# Patient Record
Sex: FEMALE | Race: WHITE
[De-identification: names, ages, dates, MRNs, and addresses within clinical notes are randomized per-mention and may not be internally consistent; named-entity substitution may affect disease eponyms.]

---

## 2017-10-19 ENCOUNTER — HOSPITAL ENCOUNTER (OUTPATIENT)
Dept: HOSPITAL 25 - OREAST | Age: 62
Discharge: HOME | End: 2017-10-19
Attending: ORTHOPAEDIC SURGERY
Payer: COMMERCIAL

## 2017-10-19 VITALS — DIASTOLIC BLOOD PRESSURE: 82 MMHG | SYSTOLIC BLOOD PRESSURE: 122 MMHG

## 2017-10-19 DIAGNOSIS — I10: ICD-10-CM

## 2017-10-19 DIAGNOSIS — Z87.891: ICD-10-CM

## 2017-10-19 DIAGNOSIS — M24.842: ICD-10-CM

## 2017-10-19 DIAGNOSIS — E78.5: ICD-10-CM

## 2017-10-19 DIAGNOSIS — M18.0: Primary | ICD-10-CM

## 2017-10-19 DIAGNOSIS — Z85.828: ICD-10-CM

## 2017-10-19 PROCEDURE — 88311 DECALCIFY TISSUE: CPT

## 2017-10-19 PROCEDURE — 88304 TISSUE EXAM BY PATHOLOGIST: CPT

## 2017-10-19 PROCEDURE — C1713 ANCHOR/SCREW BN/BN,TIS/BN: HCPCS

## 2017-10-20 NOTE — OP
OPERATIVE REPORT:

 

DATE OF OPERATION:  10/19/17 - RACHELLE

 

DATE OF BIRTH:  [birth year]

 

SURGEON:  Mo Fountain MD.

 

ASSISTANT:  AMAURY Tom.

 

An assistant was needed for the entirety of the procedure to aid in positioning 
of the arm and retraction.



ANESTHESIOLOGIST:  Digna Valdes MD.



ANESTHESIA:  General.

 

PRE-OP DIAGNOSES:

1.  Left stage 3 basal joint arthritis.

2.  Significant hyperextension laxity of the left thumb metacarpophalangeal 
joint.

 

POST-OP DIAGNOSES:

1.  Left stage 4 basal joint arthritis.

2.  Significant hyperextension laxity of the left thumb metacarpophalangeal 
joint.

 

OPERATIVE PROCEDURE:

1.  Left thumb basal joint arthroplasty with trapeziectomy and distally based 
split flexor carpi radialis tendon transfer for thumb suspension and tendon 
interposition.

2.  Left partial trapeziodectomy.

3.  Left thumb metacarpophalangeal joint volar capsular imbrication.

 

INDICATIONS:  Terra has had progressive pain in the base of the thumb.  She 
is really significantly impacting her quality of life.  We talked about her 
treatment options and nonoperative treatment options and surgery and she had 
wanted to proceed.  We talked about the risks and benefits and expected 
postoperative course.

 

ESTIMATED BLOOD LOSS:  5 mL.

 

COMPLICATIONS:  None.

 

FINDINGS:  As expected with the exception of full thickness cartilage loss of 
the distal pole of the scaphoid in the scaphoid trapezoid joint and also off of 
the distal pole of the scaphoid and the scaphotrapezial joint.

 

DESCRIPTION OF PROCEDURE:  Terra was seen in the preoperative holding area.  
The correct side, site, and procedure were identified.  We came back to the 
operating room where the anesthesia was induced, the arm was prepped and draped 
in the usual fashion.  A time-out was performed.

 

We exsanguinated the arm with the Esmarch and the tourniquet was inflated to 
250 mmHg.  I then made a longitudinal incision from the dorsal base of the 
first metacarpal done towards the radial styloid of about 2 to 3 cm.  The 
dissection was carried down longitudinally to preserve the dorsal sensory 
radial nerves.  The radial artery was identified and mobilized.  I cauterized 
the venous plexus there and then made a longitudinal incision through the 
capsule and periosteum over the trapezium.  Full thickness subperiosteal and 
capsular flaps were raised.  The soft tissue around the trapezium was released 
with a [place] blade.  I used the rongeur to excise the trapezium in its 
entirety.  Once the entirety of the trapezium was excised and there were no 
bony fragments were made, I went ahead and pulled longitudinal traction on the 
second metacarpal and inspected the scaphotrapezoid joint.  The entirety of the 
distal pole of the scaphoid was to void of articular cartilage.  This involves 
both the scaphotrapezial and the scaphotrapezoid joints. I went ahead and took 
my osteotome and excised the proximal 2 to 3 mm of trapezoid. Once I had that 
excised, I irrigated out and got the joint nice and clean.  I then placed axial 
traction on the second metacarpal and took at the wrist through flexion and 
extension.  I could not get the base of the proximal aspect of the trapezoid to 
contact the distal pole of the scaphoid.  At this point, I went ahead and 
raised the subperiosteally the soft tissue off of the dorsal radial base of the 
first metacarpal.  I used sequentially larger drill bits to clear a bone tunnel 
from the dorsoradial thumb metacarpal base extending out the volar ulnar 
articular surface of the thumb metacarpal base.  Once I had a drill hole created
, we irrigated out the wound and turned our attention on the forearm.

 

I made a 1-cm transverse incision over the distal FCR tendon just proximal to 
the wrist flexion crease.  The tendon was created at the sheath and then 
brought up into the wound where it was split longitudinally and a 25-gauge wire 
was passed through the split.  I then made 2 separate transverse incisions each 
about 7 or 8 cm proximal to the left and the FCR tendon sheath was released 
along its entirety. I then used Alyssa clamp to retrieve the 25-gauge wire and 
pulled it sequentially into the 2 more proximal wounds releasing the half of 
the FCR tendon at the musculotendinous junction.  This was pulled into the 
distal wound and then brought up where the tendon split into the tendon, which 
created its muscular remnants and the edge of the tendon was sewed with 3-0 
Ethibond to prevent fraying and splitting during the transfer.  I then used two 
25-gauge wires to shuttle the tendon down into the thumb base wound through the 
remaining FCR sheath distally.  I then completed the split down to the base of 
the second metacarpal with the tenotomy scissors.  The wires were used to pass 
the tendon through the drill hole and the base of the thumb metacarpal and then 
back around the intact limb of the FCR. Appropriate tension was set and this 
tendon transfer was secured with three 3-0 Ethibond figure-of-eight sutures, 
the first grabbed in all 3 limbs of the transfer and then the second 2 sewing 
intact limb to intact limb.  The remainder of the tendon was sewn with 4-0 
Vicryl into a mat and this was placed as an interposition between the base of 
the trapezoid and the distal pole of the scaphoid.  The remainder of the tendon 
was placed between the distal pole of the scaphoid and the base of the thumb 
metacarpal.  The capsule was then closed with 3-0 Ethibond figure- of-eight 
sutures.  Once I had a watertight seal, I went ahead and turned our attention 
to the volar aspect of the thumb metacarpophalangeal joint.

 

I raised a Phyllis type V-shaped flap radially based over the volar aspect of 
the thumb metacarpophalangeal joint.  The A1 pulley was split.  The tendon was 
retracted.  I mobilized the volar plate by releasing it radially, proximally, 
and ulnarly.  I then placed a Mini Mitek suture anchor into the distal 
metacarpal.  The volar plate was advanced and sewn in place with the 2 limbs of 
the suture coming off of the suture anchor.  This was done with the thumb MCP 
joint and 30 degrees of flexion.  I took some 4-0 Vicryl suture and secured the 
remainder of the volar plate back into place.  When I tested the stability, I 
could get the thumb just to neutral extension, but not into hyperextension.  I 
was very pleased with this.  We therefore irrigated out the wounds.  All the 
wounds were then closed with 4-0 nylon suture.  The 0.25% Marcaine was 
infiltrated into all of the wounds.  The wounds were dressed with Xeroform, 4x4s
, Sterile Webril, and a thumb spica splint was placed, holding the MCP joint in 
30 degrees of flexion and the metacarpal in abduction.  Once the thumb spica 
splint was on, the tourniquet was deflated and the hand pinked up immediately.  
She was then woken up and taken to the recovery room in stable condition.

 

 [id number]/[facility] #: [id number]

SURAJ

## 2019-04-15 NOTE — HP
HISTORY AND PHYSICAL:

 

DATE OF ADMISSION/SURGERY:  04/25/19

 

DATE OF OFFICE VISIT:  04/15/19

 

SURGEON:  Ava Brody MD* (dictated by AMAURY Pablo).

 

PROCEDURE:  Right total knee arthroplasty.

 

CHIEF COMPLAINT:  Right knee pain.

 

HISTORY OF PRESENT ILLNESS:  Ms. Shepherd is a 63-year-old female with endstage 
osteoarthritis of the right knee.  She has failed conservative treatment and 
elected to proceed with the right total knee arthroplasty.

 

PAST MEDICAL HISTORY:

1.  Hypertension.

2.  Squamous cell carcinoma of the skin.

 

PAST SURGICAL HISTORY:

1.  Left foot surgery.

2.  Left hand surgery.

 

CURRENT MEDICATIONS:

1.  Escitalopram 10 mg a day.

2.  Atenolol 50 mg twice a day.

3.  Fenofibrate 54 mg a day.

4.  Simvastatin 10 mg q.h.s.

5.  Vitamin B12 monthly injection.

6.  Tylenol as needed.

 

ALLERGIES:  No known drug allergies.

 

FAMILY HISTORY:  Colon cancer and coronary artery disease.

 

SOCIAL HISTORY:  She is a 63-year-old female.  She lives with her .  She 
does not smoke or use drugs, uses occasional alcohol.

 

REVIEW OF SYSTEMS:  A complete 14-point review of systems reviewed with the 
patient.  It was all negative and noncontributory.  She denies a history of DVT
, PE, hepatitis, HIV, or anesthesia problems.

 

                               PHYSICAL EXAMINATION

 

GENERAL:  She is well developed, well nourished, in no acute distress.

 

VITAL SIGNS:  She stands 62 inches tall, weighs 147 pounds.  Her blood pressure 
is 122/64, heart rate 60.

 

HEENT:  Normocephalic, atraumatic.

 

NECK:  Supple, no palpable lymph nodes.

 

PULMONARY:  Lungs are clear to auscultation bilaterally.

 

CARDIAC:  Regular rate and rhythm.  Strong S1 and S2.

 

ABDOMEN:  Soft, nontender, nondistended.

 

NEUROLOGICAL:  She is alert and oriented x3.

 

MUSCULOSKELETAL:  Right lower extremity, the skin is intact.  There are no open 
wounds or abrasions.  There is a 12-degree valgus deformity of the right knee. 
There is some moderate effusion.  Range of motion is 5 to 125 degrees of 
flexion with significant patellofemoral crepitus.  Her calf is soft and 
nontender.  She is able to dorsiflex and plantarflex.  She has a 2+ dorsalis 
pedis pulse and intact sensation.

 

 ASSESSMENT AND PLAN:  Ms. Shepherd is a 63-year-old female with endstage 
osteoarthritis of the right knee.  She has failed conservative treatment and 
elected to proceed with a right total knee arthroplasty.  The surgery is 
scheduled for 04/25/19 with Dr. Brody.  Dr. Brody discussed the risks and 
benefits of the surgery at today's visit and all of her questions were 
answered.  She will follow up with Dr. Brody 2 weeks after the surgery.

 

 ____________________________________ 

AMAURY PABLO

 

[id number]/[facility] #: [id number]

SURAJ

## 2019-04-25 ENCOUNTER — HOSPITAL ENCOUNTER (INPATIENT)
Dept: HOSPITAL 25 - AA | Age: 64
LOS: 3 days | Discharge: HOME HEALTH | DRG: 302 | End: 2019-04-28
Attending: ORTHOPAEDIC SURGERY | Admitting: ORTHOPAEDIC SURGERY
Payer: COMMERCIAL

## 2019-04-25 DIAGNOSIS — Z82.0: ICD-10-CM

## 2019-04-25 DIAGNOSIS — R42: ICD-10-CM

## 2019-04-25 DIAGNOSIS — M21.061: ICD-10-CM

## 2019-04-25 DIAGNOSIS — Z72.89: ICD-10-CM

## 2019-04-25 DIAGNOSIS — E87.1: ICD-10-CM

## 2019-04-25 DIAGNOSIS — Z80.0: ICD-10-CM

## 2019-04-25 DIAGNOSIS — F32.9: ICD-10-CM

## 2019-04-25 DIAGNOSIS — M17.11: Primary | ICD-10-CM

## 2019-04-25 DIAGNOSIS — I45.6: ICD-10-CM

## 2019-04-25 DIAGNOSIS — D62: ICD-10-CM

## 2019-04-25 DIAGNOSIS — R11.0: ICD-10-CM

## 2019-04-25 DIAGNOSIS — Z82.49: ICD-10-CM

## 2019-04-25 DIAGNOSIS — G43.909: ICD-10-CM

## 2019-04-25 DIAGNOSIS — R09.02: ICD-10-CM

## 2019-04-25 DIAGNOSIS — M25.461: ICD-10-CM

## 2019-04-25 DIAGNOSIS — I10: ICD-10-CM

## 2019-04-25 DIAGNOSIS — J90: ICD-10-CM

## 2019-04-25 DIAGNOSIS — J81.1: ICD-10-CM

## 2019-04-25 DIAGNOSIS — Z79.01: ICD-10-CM

## 2019-04-25 DIAGNOSIS — K21.9: ICD-10-CM

## 2019-04-25 DIAGNOSIS — E78.00: ICD-10-CM

## 2019-04-25 DIAGNOSIS — E53.8: ICD-10-CM

## 2019-04-25 DIAGNOSIS — M25.761: ICD-10-CM

## 2019-04-25 DIAGNOSIS — Z85.828: ICD-10-CM

## 2019-04-25 DIAGNOSIS — F41.9: ICD-10-CM

## 2019-04-25 DIAGNOSIS — E78.5: ICD-10-CM

## 2019-04-25 DIAGNOSIS — Z87.891: ICD-10-CM

## 2019-04-25 PROCEDURE — 80048 BASIC METABOLIC PNL TOTAL CA: CPT

## 2019-04-25 PROCEDURE — 83880 ASSAY OF NATRIURETIC PEPTIDE: CPT

## 2019-04-25 PROCEDURE — 36415 COLL VENOUS BLD VENIPUNCTURE: CPT

## 2019-04-25 PROCEDURE — 0SRC069 REPLACEMENT OF RIGHT KNEE JOINT WITH OXIDIZED ZIRCONIUM ON POLYETHYLENE SYNTHETIC SUBSTITUTE, CEMENTED, OPEN APPROACH: ICD-10-PCS | Performed by: ORTHOPAEDIC SURGERY

## 2019-04-25 PROCEDURE — 88311 DECALCIFY TISSUE: CPT

## 2019-04-25 PROCEDURE — 71045 X-RAY EXAM CHEST 1 VIEW: CPT

## 2019-04-25 PROCEDURE — 85018 HEMOGLOBIN: CPT

## 2019-04-25 PROCEDURE — 83735 ASSAY OF MAGNESIUM: CPT

## 2019-04-25 PROCEDURE — 85049 AUTOMATED PLATELET COUNT: CPT

## 2019-04-25 PROCEDURE — C1776 JOINT DEVICE (IMPLANTABLE): HCPCS

## 2019-04-25 PROCEDURE — 85014 HEMATOCRIT: CPT

## 2019-04-25 PROCEDURE — 88305 TISSUE EXAM BY PATHOLOGIST: CPT

## 2019-04-25 RX ADMIN — ACETAMINOPHEN SCH MG: 325 TABLET ORAL at 16:04

## 2019-04-25 RX ADMIN — ONDANSETRON PRN MG: 2 INJECTION INTRAMUSCULAR; INTRAVENOUS at 23:08

## 2019-04-25 RX ADMIN — CEFAZOLIN SCH MLS/HR: 1 INJECTION, POWDER, FOR SOLUTION INTRAVENOUS at 19:39

## 2019-04-25 RX ADMIN — SODIUM CHLORIDE, SODIUM LACTATE, POTASSIUM CHLORIDE, AND CALCIUM CHLORIDE SCH MLS/HR: 600; 310; 30; 20 INJECTION, SOLUTION INTRAVENOUS at 15:16

## 2019-04-25 RX ADMIN — OXYCODONE HYDROCHLORIDE AND ACETAMINOPHEN PRN TAB: 5; 325 TABLET ORAL at 23:08

## 2019-04-25 RX ADMIN — MAGNESIUM HYDROXIDE SCH ML: 400 SUSPENSION ORAL at 21:07

## 2019-04-25 RX ADMIN — TRAMADOL HYDROCHLORIDE PRN MG: 50 TABLET, FILM COATED ORAL at 18:07

## 2019-04-25 RX ADMIN — DOCUSATE SODIUM SCH MG: 100 CAPSULE, LIQUID FILLED ORAL at 21:07

## 2019-04-25 NOTE — PN
Progress Note





- Progress Note


Date of Service: 04/25/19


Note: 





resting comfortably in recovery; able to dorsi flex/plantar flex, 2+ DP pulse 

and intact sensation, dressing c/d/i

## 2019-04-25 NOTE — XMS REPORT
Continuity of Care Document (CCD)

 Created on:April 15, 2019



Patient:Presley Castanon

Sex:Female

:1955

External Reference #:[id number]





Demographics







 Address  [address]



   [place] [id number]

 

 Home Phone  [phone]

 

 Mobile Phone  [phone]

 

 Work Phone  [phone]

 

 Email Address  [email or web address]

 

 Preferred Language  en

 

 Marital Status  Not  or 

 

 Zoroastrianism Affiliation  Unknown

 

 Race  White

 

 Ethnic Group  Not  or 









Author







 Name  ZaraAlexus thacker









Support







 Name  Relationship  Address  Phone

 

 Gaurav Castanon  Unavailable  [address] [place] [address] RD  [phone]



     [place] [address]  









Care Team Providers







 Name  Role  Phone

 

 Wendy Parekh MD  Primary Care Physician  Unavailable









Payers







 Date  Identification Numbers  Payment Provider  Subscriber

 

 Effective: 2017  Policy Number: [id number]  [employer]  Presley Castanon









 PayID: [id number]  [address] [address]









 [place] [id number]









 Effective: 2011  Policy Number: [id number]  [place]  Presley Castanon









 Expires: 2015  PayID: [id number]  [address] [address]









 [place] [id number]







Advance Directives







 Description

 

 No Information Available







Problems







 Date  Description  Provider  Status

 

 Onset: 2011  Mixed hyperlipidemia  Lorrie Rodríguez M.D., FACP  Active

 

 Onset: 2011  Benign essential hypertension  Laith Hart M.D.  
Active

 

 Onset: 2017  Localized, primary osteoarthritis  Mo Fountain MD  
Active



   of the hand    

 

 Onset: 10/26/2018  Localized, primary osteoarthritis  Ava Brody M.D.  
Active







Family History







 Date  Family Member(s)  Observation  Comments

 

   General  Heart Disease  

 

   General  Cancer  

 

 :  (age 58  Father   due to Cancer,  



 Years)    Colon  

 

   Mother  CABG  HTN, Dementia - Age 85



        

 

   First Son  Alive And Well  

 

   First Brother  Alive And Well  

 

   First Sister  Alive And Well  







Social History







 Type  Date  Description  Comments

 

 Birth Sex    Unknown  

 

 Marital Status      

 

 Lives With      

 

 Occupation    Banking  

 

 Tobacco Use  Start: Unknown End:  Former Cigarette Smoker  Quit ~ 



   Unknown  1 Pack Daily  

 

 Smoking Status  Reviewed: 04/15/19  Former Cigarette Smoker  Quit ~ 



     1 Pack Daily  

 

 ETOH Use    Currently consumes  2 glasses of wine



     alcohol  daily

 

 Tobacco Use  Start: Unknown End:  Patient is a former  quit at age 29



   Unknown  smoker  

 

 Exercise Type/Frequency    Exercises regularly  







Allergies, Adverse Reactions, Alerts







 Description

 

 No Known Drug Allergies







Medications







 Medication  Date  Status  Form  Strength  Qnty  SIG  Indications  Ordering



                 Provider

 

 Voltaren  03/15  Active  Gel  1%  100un  apply 4 Gm  M25.562          its  to affected    Varn,



             area four    N.P.



             times daily    

 

 BD    Active    3ml  6unit  use as            s  directed for    Varn,



             vitamin b 12    N.P.



             injection    



             once a month    

 

 Fenofibrate    Active  Tablets  54mg  90tab  take 1            s  tablet by    Varn,



             mouth daily    N.P.

 

 Omeprazole    Active  Capsules DR  20mg  90cap  take 1            s  capsule once    Varn,



             daily if    N.P.



             needed    

 

 Syringes    Active    25Gauge 1  30uni  use as          Inch  ts  directed for    Varn,



             vitamin b12    N.P.



             injections    



             once monthly    

 

 Simvastatin    Active  Tablets  10mg  90tab  take 1            s  tablet at    Varn,



             bedtime    N.P.

 

 Atenolol    Active  Tablets  50mg  90tab  take 1    Archana        s  tablet twice    Varn,



             a day    N.P.

 

 Cyanocobalamin    Active  Solution  1000mcg/M  1unit  inject 1          L  s  milliliters    Varn,



             intramuscula    N.P.



             r once a    



             month    

 

 Tylenol    Active            Unknown



   /0000              

 

                 

 

 Escitalopram  03/15  Hx  Tablets  10mg  30tab  1 by mouth  F32.9  Archana



 Oxalate  /2019        s  every day    Varn,



   -              N.P.



                 

 

 Meloxicam  10/26  Hx  Tablets  15mg  30tab  1 by mouth  M25.461  Ava        s  every day    Ronn



   -          (not taking)    M.D.



   03/15              



   /2019              

 

 Tramadol HCL  10/19  Hx  Tablets  50mg  30tab  1-2 tablets            s  every 6    Fountain,



   -          hours as    MD



                 

 

 Ibuprofen  10/19  Hx  Tablets  600mg  60tab  1 by mouth            s  three times    Fountain,



   -          a day as    MD



             needed    



                 

 

 Hydrocodone-Ace  10/16  Hx  Tablets  5-325mg  30tab  1 or 2 tabs    Mo



 taminophen          s  by mouth    Fountain,



   -          every 6-8    MD



   10/19          hours as    



   /2017          needed for    



             postop pain    

 

 Syringe 2-3 ML    Hx  Misc  3ml  30uni  to use for            ts  b12    Varn,



   -          injections    N.P.



                 

 

 Escitalopram    Hx  Tablets  10mg  30tab  1 by mouth  311  Archana



 Oxalate  /2015        s  every day    Varn,



   -              N.P.



                 

 

 Ciclopirox    Hx  Solution  8%  6.600  apply to  110.1          ml  affected    Varn,



   -          toenails and    N.P.



             skin at    



             bedtime    

 

 Lac-Hydrin    Hx  Cream  12%  385gm  apply once  782.1            daily as    Varn,



   -          needed    N.P.



                 

 

 Fluticasone    Hx  Suspension  50mcg/Act  1bott  1 spray each    Archana



 Propionate  /2013        le  nostril in    Varn,



   -          in the    N.P.



   04/14          morning    



   /2019              

 

 Physical    Hx        left ankle  719.47  Lorrie



 Therapy  /2011          pain    Molina Rodríguez M.D.,



                 FACP



                 

 

 Omeprazole    Hx  Capsules DR  20mg  90cap  1 tablet    Lorrie        s  daily as    Anne,



   -          needed for    M.D.,



             stomach    FACP



             discomfort    

 

 Syringes    Hx            Lorrie



   /              Molina Rodríguez M.D.,



                 FACP



                 

 

 Minocycline HCL    Hx    50mg        Jayla,



   /0000              Lady,



   -              MD



                 

 

 Minocycline HCL    Hx    50mg        Jayla,



   /0000              Lady



   -              MD



                 

 

 Prempro    Hx    0.625-2.5    1 tablet    Anne,



   /0000      mg    daily    Lorrie,



   -              MD



   10/21              



   /2010              

 

 Sulfacetamide    Hx    10%    apply daily    Jayla,



 Sodium  /0000              Lady,



   -              MD



                 

 

 Diazepam    Hx    5mg        Montanye,



   /0000              Bladimir



   -              MD



                 

 

 Valacyclovir    Hx    500mg        Anne,



 HCL  /0000              Molina Andrew MD



                 

 

 Oracea    Hx    40mg        Jayla,



   /0000              Molina Narayanan MD



                 

 

 Finacea    Hx    15%        Jayla,



   /0000              Molina Narayanan MD



                 

 

 Rhinocort Aqua    Hx    32mcg/Act  3unit  1 spray each    Lorrie



   /0000        s  nostril    Anne,



   -          daily    M.DUlices,



                 







Medications Administered in Office







 Medication  Date  Status  Form  Strength  Qnty  SIG  Indications  Ordering



                 Provider

 

 Synvisc Or    Administered  Injection          Ava



 Synvisc-One  Sloan Brody M.D.



 Injection 1 MG                

 

 Synvisc Or    Administered  Injection          Ava



 Synvisc-One  Hernan Brody M.D.



 Injection 1 MG                

 

 Synvisc Or    Administered  Injection          Ava



 Synvisc-One  Hernan Brody M.D.



 Injection 1 MG                

 

 Synvisc Or  12/10/2  Administered  Injection          Ava



 Synvisc-One  Hernan Brody M.D.



 Injection 1 MG                

 

 Synvisc Or  12/10/2  Administered  Injection          Ava



 Synvisc-One  Hernan Brody M.D.



 Injection 1 MG                

 

 Synvisc Or    Administered  Injection          Ava



 Synvisc-One  Hernan Brody M.D.



 Injection 1 MG                

 

 Depomedrol    Administered  Injection          Ava



 40MG  Hernan Brody M.D.

 

 Depomedrol  10/26/2  Administered  Injection          Ava



 40MG  Hernan Brody M.D.

 

 B-12 Injection    Administered  Injection          Nurse Visit



   014              A







Immunizations







 CPT Code  Status  Date  Vaccine  Lot #

 

 [id number]  Given  2018  Influenza Virus Vaccine, Quadrivalent, Split,  



       Preservative Free  

 

   Given  2016  Flu Vaccine NOS  

 

 86564  Given  10/10/2015  Influenza Virus Vaccine, Quadrivalent, Split,  



       Preservative Free  

 

   Given  2014  Fluvirin Im 3Yrs And Older  

 

   Given  2013  Fluvirin Im 3Yrs And Older  

 

   Given  2012  Afluria Vaccine  

 

 [id number]  Given  2011  Influenza Virus 3Yrs & Over  

 

 54449  Given  2011  Influenza Virus 3Yrs & Over  

 

 25389  Given  2011  Influenza Virus 3Yrs & Over  [id number]

 

 [id number]  Given  10/21/2010  Influenza Virus 3Yrs & Over  

 

 [id number]  Given  10/28/2009  Influenza Virus 3Yrs & Over  

 

 96123  Given  2008  Tdap - Tetanus/Diptheria/Acellular Pertussis  







Vital Signs







 Date  Vital  Result  Comment

 

 04/15/2019 12:57pm  Height  62 inches  5'2"









 Weight  147.00 lb  

 

 Heart Rate  60 /min  

 

 BP Systolic  122 mmHg  

 

 BP Diastolic  62 mmHg  

 

 Respiratory Rate  18 /min  

 

 Body Temperature  98.9 F  

 

 Pain Level  0  

 

 BMI (Body Mass Index)  26.9 kg/m2  









 04/10/2019  3:37pm  Height  62 inches  5'2"









 Weight  150.00 lb  

 

 Heart Rate  60 /min  

 

 BP Systolic Sitting  137 mmHg  

 

 BP Diastolic Sitting  81 mmHg  

 

 O2 % BldC Oximetry  98 %  

 

 BMI (Body Mass Index)  27.4 kg/m2  









 2019  9:10am  Height  62 inches  5'2"









 Weight  145.00 lb  

 

 Heart Rate  60 /min  

 

 BP Systolic Sitting  110 mmHg  

 

 BP Diastolic Sitting  70 mmHg  

 

 Respiratory Rate  18 /min  

 

 Pain Level  3  

 

 BMI (Body Mass Index)  26.5 kg/m2  









 03/15/2019  3:34pm  Height  62 inches  5'2"









 Weight  152.00 lb  

 

 Heart Rate  56 /min  

 

 BP Systolic  132 mmHg  

 

 BP Diastolic  80 mmHg  

 

 Body Temperature  97.7 F  

 

 O2 % BldC Oximetry  98 %  

 

 BMI (Body Mass Index)  27.8 kg/m2  









 2019  4:05pm  Height  62 inches  5'2"









 Weight  151.00 lb  

 

 Heart Rate  69 /min  

 

 BP Systolic  138 mmHg  

 

 BP Diastolic  82 mmHg  

 

 Body Temperature  97.7 F  

 

 O2 % BldC Oximetry  97 %  

 

 BMI (Body Mass Index)  27.6 kg/m2  









 2019  9:14am  Height  62 inches  5'2"









 Weight  145.00 lb  

 

 BP Systolic  124 mmHg  

 

 BP Diastolic  84 mmHg  

 

 Body Temperature  98.2 F  

 

 BMI (Body Mass Index)  26.5 kg/m2  









 2018  8:52am  Height  62.5 inches  5'2.50"









 Weight  145.00 lb  

 

 BP Systolic  120 mmHg  

 

 BP Diastolic  80 mmHg  

 

 Body Temperature  97.6 F  

 

 BMI (Body Mass Index)  26.1 kg/m2  









 12/10/2018  8:34am  Height  62 inches  5'2"









 Weight  145.00 lb  

 

 BP Systolic  108 mmHg  

 

 BP Diastolic  64 mmHg  

 

 Body Temperature  98.2 F  

 

 BMI (Body Mass Index)  26.5 kg/m2  









 2018  8:23am  Height  62 inches  5'2"









 Weight  144.00 lb  

 

 BP Systolic  118 mmHg  

 

 BP Diastolic  72 mmHg  

 

 Body Temperature  98.4 F  

 

 BMI (Body Mass Index)  26.3 kg/m2  









 2018  3:43pm  Height  62 inches  5'2"









 Heart Rate  63 /min  

 

 BP Systolic  108 mmHg  

 

 BP Diastolic  60 mmHg  

 

 Respiratory Rate  17 /min  

 

 Pain Level  2  









 10/26/2018 11:24am  Height  62 inches  5'2"









 Weight  150.00 lb  

 

 BP Systolic  131 mmHg  

 

 BP Diastolic  82 mmHg  

 

 Respiratory Rate  16 /min  

 

 Pain Level  5  

 

 BMI (Body Mass Index)  27.4 kg/m2  









 2018  3:00pm  Height  62 inches  5'2"









 Weight  147.00 lb  

 

 Heart Rate  55 /min  

 

 BP Systolic  113 mmHg  

 

 BP Diastolic  71 mmHg  

 

 O2 % BldC Oximetry  100 %  

 

 BMI (Body Mass Index)  26.9 kg/m2  









 2018  3:48pm  Heart Rate  62 /min  









 BP Systolic  130 mmHg  

 

 BP Diastolic  78 mmHg  

 

 Respiratory Rate  16 /min  

 

 Body Temperature  97.0 F  

 

 Pain Level  0  









 2017  3:00pm  Heart Rate  64 /min  









 BP Systolic Sitting  128 mmHg  

 

 BP Diastolic Sitting  78 mmHg  

 

 Body Temperature  97.8 F  









 2017 12:57pm  Height  62.5 inches  5'2.50"









 Weight  157.00 lb  

 

 Heart Rate  64 /min  

 

 BP Systolic  105 mmHg  

 

 BP Diastolic  71 mmHg  

 

 Body Temperature  98.3 F  

 

 Pain Level  7  

 

 BMI (Body Mass Index)  28.3 kg/m2  









 2017  9:51am  Height  62.5 inches  5'2.50"









 Weight  157.25 lb  

 

 Heart Rate  74 /min  

 

 BP Systolic  122 mmHg  

 

 BP Diastolic  70 mmHg  

 

 Body Temperature  97.6 F  

 

 O2 % BldC Oximetry  99 %  

 

 BMI (Body Mass Index)  28.3 kg/m2  









 2017  8:32am  Height  62.25 inches  5'2.25"









 Weight  154.25 lb  

 

 Heart Rate  61 /min  

 

 BP Systolic  120 mmHg  

 

 BP Diastolic  80 mmHg  

 

 Body Temperature  98.6 F  

 

 O2 % BldC Oximetry  94 %  

 

 BMI (Body Mass Index)  28.0 kg/m2  









 2016 11:03am  Height  62.25 inches  5'2.25"









 Weight  154.50 lb  

 

 Heart Rate  60 /min  

 

 BP Systolic Sitting  136 mmHg  

 

 BP Diastolic Sitting  82 mmHg  

 

 Body Temperature  98.3 F  

 

 O2 % BldC Oximetry  100 %  

 

 BMI (Body Mass Index)  28.0 kg/m2  









 2016  9:35am  Height  62.5 inches  5'2.50"









 Weight  154.00 lb  

 

 Heart Rate  60 /min  

 

 BP Systolic Sitting  136 mmHg  

 

 BP Diastolic Sitting  88 mmHg  

 

 Body Temperature  98.1 F  

 

 O2 % BldC Oximetry  99 %  

 

 BMI (Body Mass Index)  27.7 kg/m2  









 2015  8:37am  Height  62.5 inches  5'2.50"









 Weight  149.75 lb  

 

 Heart Rate  63 /min  

 

 BP Systolic  159 mmHg  

 

 BP Diastolic  96 mmHg  

 

 BP Systolic Recheck  138 mmHg  

 

 BP Diastolic Recheck  88 mmHg  

 

 Body Temperature  98.6 F  

 

 BMI (Body Mass Index)  27.0 kg/m2  









 2015  8:54am  Height  62.5 inches  5'2.50"









 Weight  145.00 lb  

 

 Heart Rate  60 /min  

 

 BP Systolic Sitting  128 mmHg  

 

 BP Diastolic Sitting  78 mmHg  

 

 Body Temperature  99.2 F  

 

 BMI (Body Mass Index)  26.1 kg/m2  









 2013  1:57pm  Height  62.5 inches  5'2.50"









 Weight  153.25 lb  

 

 Heart Rate  68 /min  

 

 BP Systolic Sitting  142 mmHg  

 

 BP Diastolic Sitting  80 mmHg  

 

 BMI (Body Mass Index)  27.6 kg/m2  









 2012  3:44pm  Height  62.5 inches  5'2.50"









 Weight  143.00 lb  

 

 Heart Rate  68 /min  

 

 BP Systolic Sitting  124 mmHg  

 

 BP Diastolic Sitting  80 mmHg  

 

 BMI (Body Mass Index)  25.7 kg/m2  









 2012 10:32am  Height  62.5 inches  5'2.50"









 Weight  144.00 lb  

 

 Heart Rate  60 /min  

 

 BP Systolic Sitting  136 mmHg  

 

 BP Diastolic Sitting  80 mmHg  

 

 BMI (Body Mass Index)  25.9 kg/m2  









 10/20/2011  3:45pm  Height  62 inches  5'2"









 Weight  147.00 lb  

 

 Heart Rate  76 /min  

 

 BP Systolic Sitting  148 mmHg  

 

 BP Diastolic Sitting  80 mmHg  

 

 BMI (Body Mass Index)  26.9 kg/m2  









 2011 12:50pm  Height  62 inches  5'2"









 Weight  144.00 lb  

 

 Heart Rate  68 /min  

 

 BP Systolic Sitting  126 mmHg  

 

 BP Diastolic Sitting  80 mmHg  

 

 BMI (Body Mass Index)  26.3 kg/m2  









 2011  3:41pm  Height  62 inches  5'2"









 Weight  145.00 lb  

 

 BMI (Body Mass Index)  26.5 kg/m2  









 2011  3:50pm  Weight  146.00 lb  









 Heart Rate  66 /min  

 

 BP Systolic Sitting  124 mmHg  

 

 BP Diastolic Sitting  82 mmHg  









 2011  3:47pm  Weight  147.00 lb  









 Heart Rate  62 /min  

 

 BP Systolic  130 mmHg  

 

 BP Diastolic  80 mmHg  









 2011  2:23pm  Weight  146.75 lb  









 Heart Rate  78 /min  

 

 BP Systolic  118 mmHg  

 

 BP Diastolic  82 mmHg  









 10/21/2010  9:09am  Weight  147.75 lb  









 Heart Rate  72 /min  

 

 BP Systolic  120 mmHg  

 

 BP Diastolic  78 mmHg  







Results







 Test  Date  Facility  Test  Result  H/L  Range  Note

 

 CBC Auto Diff  04/10/2019  [facility]  White Blood  7.3 10^3/uL  N  
3.5-10.8  



     [address]  Count        



     [place] [address] [phone]          









 Red Blood Count  3.63 10^6/uL  Low  3.70-4.87  

 

 Hemoglobin  12.4 g/dL  N  12.0-16.0  

 

 Hematocrit  36 %  N  33-41  

 

 Mean Corpuscular Volume  99 fL  High  80-97  

 

 Mean Corpuscular Hemoglobin  34 pg  High  27-31  

 

 Mean Corpuscular HGB Conc  35 g/dL  N  31-36  

 

 Red Cell Distribution Width  13 %  N  10.5-15  

 

 Platelet Count  214 10^3/uL  N  150-450  

 

 Mean Platelet Volume  9.1 fL  N  7.4-10.4  

 

 Abs Neutrophils  4.5 10^3/uL  N  1.5-7.7  

 

 Abs Lymphocytes  2.0 10^3/uL  N  1.0-4.8  

 

 Abs Monocytes  0.6 10^3/uL  N  0-0.8  

 

 Abs Eosinophils  0.2 10^3/uL  N  0-0.6  

 

 Abs Basophils  0 10^3/uL  N  0-0.2  

 

 Abs Nucleated RBC  0 10^3/uL      

 

 Granulocyte %  61.8 %      

 

 Lymphocyte %  27.0 %      

 

 Monocyte %  8.5 %      

 

 Eosinophil %  2.1 %      

 

 Basophil %  0.6 %      

 

 Nucleated Red Blood Cells %  0      









 Comp Metabolic Panel  04/10/2019  [facility]  Sodium  138 mmol/L  N
  135-145  



     [address]          



     [place] [address] [phone]          









 Potassium  4.3 mmol/L  N  3.5-5.0  

 

 Chloride  105 mmol/L  N  101-111  

 

 Co2 Carbon Dioxide  26 mmol/L  N  22-32  

 

 Anion Gap  7 mmol/L  N  2-11  

 

 Glucose  92 mg/dL  N    

 

 Blood Urea Nitrogen  19 mg/dL  N  6-24  

 

 Creatinine  0.79 mg/dL  N  0.51-0.95  

 

 BUN/Creatinine Ratio  24.1  High  8-20  

 

 Calcium  9.2 mg/dL  N  8.6-10.3  

 

 Total Protein  7.0 g/dL  N  6.4-8.9  

 

 Albumin  4.3 g/dL  N  3.2-5.2  

 

 Globulin  2.7 g/dL  N  2-4  

 

 Albumin/Globulin Ratio  1.6  N  1-3  

 

 Total Bilirubin  0.40 mg/dL  N  0.2-1.0  

 

 Alkaline Phosphatase  52 U/L  N    

 

 Alt  15 U/L  N  7-52  

 

 Ast  17 U/L  N  13-39  

 

 Egfr Non-  73.3    >60  

 

 Egfr   88.7    >60  1









 Urinalysis Profile  04/10/2019  [facility]  Urine Color  Straw      



     [address]  [place] [address] [phone]          









 Urine Appearance  Clear      

 

 Urine Specific Gravity  1.005  Low  1.010-1.030  

 

 Urine pH  5.0  N  5-9  

 

 Urine Urobilinogen  Negative    Negative  

 

 Urine Ketones  Negative    Negative  

 

 Urine Protein  Negative    Negative  

 

 Urine Leukocytes  Negative    Negative  

 

 Urine Blood  1+  Abnormal  Negative  

 

 Urine Nitrite  Negative    Negative  

 

 Urine Bilirubin  Negative    Negative  

 

 Urine Glucose  Negative    Negative  

 

 Urine White Blood Cell  Trace(0-5/hpf)    Absent  

 

 Urine Red Blood Cell  Trace(0-2/hpf)    Absent  

 

 Urine Bacteria  Absent    Absent  









 Inr/Protime  04/10/2019  [facility]  Inr  0.94  N  0.77-1.02  



     [address]          



     [place], [place] [address] [phone]          

 

 Hepatitis Acute  04/10/2019  [facility]  Hepatitis B  Nonreactive  
  Nonreactive  



 Panel    [address]        



     [place] [address]  Antigen        



     [phone]          









 Hepatitis B Core IgM  Nonreactive    Nonreactive  

 

 Hepatitis A AB IgM  Nonreactive    Nonreactive  

 

 HCV Index  < 0.0 Index      

 

 Hepatitis C Antibody  Nonreactive    Nonreactive  









 Urine Culture And  04/10/2019  [facility]  Urine Culture  SEE 
RESULT      2



 Sensitivities    [address]      



     [place] [address] [phone]          

 

 Laboratory test  10/04/2018  [facility]  Vitamin B12  417 pg/mL  N  
180-9  3



 finding    [address]        [address]  



     [place] [address] [phone]          

 

 Lipid Profile  2018  [facility]  Triglycerides  164 mg/dL    
  4



 (Trig/Chol/HDL)    [address]          



     [place], [place] [address] [phone]          









 Cholesterol  211 mg/dL      5

 

 HDL Cholesterol  71.1 mg/dL      6

 

 LDL Cholesterol  107 mg/dL      7









 Comp Metabolic Panel  2018  [facility]  Sodium  140 mmol/L  N
  135-145  



     [address]          



     [place] [address] [phone]          









 Potassium  4.9 mmol/L  N  3.5-5.0  

 

 Chloride  108 mmol/L  N  101-111  

 

 Co2 Carbon Dioxide  28 mmol/L  N  22-32  

 

 Anion Gap  4 mmol/L  N  2-11  

 

 Glucose  102 mg/dL  High    

 

 Blood Urea Nitrogen  14 mg/dL  N  6-24  

 

 Creatinine  0.79 mg/dL  N  0.51-0.95  

 

 BUN/Creatinine Ratio  17.7  N  8-20  

 

 Calcium  9.3 mg/dL  N  8.6-10.3  

 

 Total Protein  6.4 g/dL  N  6.4-8.9  

 

 Albumin  4.3 g/dL  N  3.2-5.2  

 

 Globulin  2.1 g/dL  N  2-4  

 

 Albumin/Globulin Ratio  2.0  N  1-3  

 

 Total Bilirubin  0.70 mg/dL  N  0.2-1.0  

 

 Alkaline Phosphatase  62 U/L  N    

 

 Alt  24 U/L  N  7-52  

 

 Ast  19 U/L  N  13-39  

 

 Egfr Non-  73.3    >60  

 

 Egfr   88.7    >60  8









 Laboratory test  10/19/2017  [facility]  Surgical Pathology  SEE 
RESULT      9, 10



 finding    [address]    [place]      



     [place] [address] [phone]          

 

 Lipid Profile  2017  [facility]  Triglycerides  190 mg/dL  N 
   11, 12



 (Trig/Chol/HDL)    [address]          



     [place] [address] [phone]          









 Cholesterol  185 mg/dL  N    13

 

 HDL Cholesterol  70.9 mg/dL  N    14

 

 LDL Cholesterol  76 mg/dL  N    15









 Comp Metabolic Panel  2017  [facility]  Sodium  140 mmol/L  N
  133-145  



     [address]          



     [place] [address] [phone]          









 Potassium  4.5 mmol/L  N  3.5-5.0  

 

 Chloride  104 mmol/L  N  101-111  

 

 Co2 Carbon Dioxide  28 mmol/L  N  22-32  

 

 Anion Gap  8 mmol/L  N  2-11  

 

 Glucose  87 mg/dL  N    

 

 Blood Urea Nitrogen  16 mg/dL  N  6-24  

 

 Creatinine  0.75 mg/dL  N  0.51-0.95  

 

 BUN/Creatinine Ratio  21.3  High  8-20  

 

 Calcium  9.5 mg/dL  N  8.6-10.3  

 

 Total Protein  6.7 g/dL  N  6.4-8.9  

 

 Albumin  4.3 g/dL  N  3.2-5.2  

 

 Globulin  2.4 g/dL  N  2-4  

 

 Albumin/Globulin Ratio  1.8  N  1-3  

 

 Total Bilirubin  0.50 mg/dL  N  0.2-1.0  

 

 Alkaline Phosphatase  69 U/L  N    

 

 Alt  21 U/L  N  7-52  

 

 Ast  16 U/L  N  13-39  

 

 Egfr Non-  78.3  N  >60  

 

 Egfr   100.7  N  >60  16









 Laboratory test  2017  [facility]  Vitamin B12  574 pg/mL  N  
180-914  17



 finding    [address] [place] [address] [phone]          

 

 CBC Auto Diff  2016  [facility]  White Blood  6.1 10^3/uL  N  
3.5-10.8  



     [address] [facility]  Count        



     [place] [address] [phone]          









 Red Blood Count  3.75 10^6/uL  Low  4.0-5.4  

 

 Hemoglobin  12.7 g/dL  N  12.0-16.0  

 

 Hematocrit  38 %  N  35-47  

 

 Mean Corpuscular Volume  100 fL  High  80-97  

 

 Mean Corpuscular Hemoglobin  34 pg  High  27-31  

 

 Mean Corpuscular HGB Conc  34 g/dL  N  31-36  

 

 Red Cell Distribution Width  13 %  N  10.5-15  

 

 Platelet Count  208 10^3/uL  N  150-450  

 

 Mean Platelet Volume  10 um3  N  7.4-10.4  

 

 Abs Neutrophils  3.7 10^3/uL  N  1.5-7.7  

 

 Abs Lymphocytes  1.5 10^3/uL  N  1.0-4.8  

 

 Abs Monocytes  0.6 10^3/uL  N  0-0.8  

 

 Abs Eosinophils  0.1 10^3/uL  N  0-0.6  

 

 Abs Basophils  0.1 10^3/uL  N  0-0.2  

 

 Abs Nucleated RBC  0.01 10^3/uL  N    

 

 Granulocyte %  61.5 %  N  38-83  

 

 Lymphocyte %  24.2 %  Low  25-47  

 

 Monocyte %  10.3 %  High  1-9  

 

 Eosinophil %  1.6 %  N  0-6  

 

 Basophil %  2.4 %  High  0-2  

 

 Nucleated Red Blood Cells %  0.1  N    









 Comp Metabolic Panel  2016  [facility]  Sodium  137 mmol/L  N
  133-145  



     [address] [place] [address] [phone]          









 Potassium  4.6 mmol/L  N  3.5-5.0  

 

 Chloride  104 mmol/L  N  101-111  

 

 Co2 Carbon Dioxide  25 mmol/L  N  22-32  

 

 Anion Gap  8 mmol/L  N  2-11  

 

 Glucose  94 mg/dL  N    

 

 Blood Urea Nitrogen  14 mg/dL  N  6-24  

 

 Creatinine  0.85 mg/dL  N  0.51-0.95  

 

 BUN/Creatinine Ratio  16.5  N  8-20  

 

 Calcium  9.6 mg/dL  N  8.6-10.3  

 

 Total Protein  6.9 g/dL  N  6.4-8.9  

 

 Albumin  4.3 g/dL  N  3.2-5.2  

 

 Globulin  2.6 g/dL  N  2-4  

 

 Albumin/Globulin Ratio  1.7  N  1-3  

 

 Total Bilirubin  0.50 mg/dL  N  0.2-1.0  

 

 Alkaline Phosphatase  45 U/L  N    

 

 Alt  16 U/L  N  7-52  

 

 Ast  16 U/L  N  13-39  

 

 Egfr Non-  68.0  N  >60  

 

 Egfr   87.4  N  >60  18









 Inr/Protime  2016  [facility]  Inr  0.96  N  0.89-1.11  



     [address]  [place] [address] [phone]          

 

 Basic Metabolic  2016  [facility]  Sodium  138 mmol/L  N  133-
145  



 Panel    [address]  [place] [address] [phone]          









 Potassium  4.2 mmol/L  N  3.5-5.0  

 

 Chloride  105 mmol/L  N  101-111  

 

 Co2 Carbon Dioxide  25 mmol/L  N  22-32  

 

 Anion Gap  8 mmol/L  N  2-11  

 

 Glucose  94 mg/dL  N    

 

 Blood Urea Nitrogen  12 mg/dL  N  6-24  

 

 Creatinine  0.83 mg/dL  N  0.51-0.95  

 

 BUN/Creatinine Ratio  14.5  N  8-20  

 

 Calcium  9.6 mg/dL  N  8.6-10.3  

 

 Egfr Non-  69.9  N  >60  

 

 Egfr   89.9  N  >60  19









 Laboratory test finding  2016  [facility]  Alt (SGPT)  30 U/L
  N  7-52  



     [address] [place] [address] [phone]          









 Ast (Sgot)  25 U/L  N  13-39  









 Laboratory test  2016  [facility]  Cytology  SEE RESULT BELOW
      20



 finding    [address]  [place] [address] [phone]          









 HPV Rna Ww/Reflex Genotype  Negative  N  Negative  21









 Lipid Profile  2016  [facility]  Triglycerides  115 mg/dL  N 
   22



 (Trig/Chol/HDL)    [address]  [place] [address] [phone]          









 Cholesterol  229 mg/dL  N    23

 

 HDL Cholesterol  73.8 mg/dL  N    24

 

 LDL Cholesterol  132 mg/dL  N    25









 Comp Metabolic Panel  2016  [facility]  Sodium  138 mmol/L  N
  133-145  



     [address] [place] [address] [phone]          









 Potassium  4.8 mmol/L  N  3.5-5.0  

 

 Chloride  104 mmol/L  N  101-111  

 

 Co2 Carbon Dioxide  27 mmol/L  N  22-32  

 

 Anion Gap  7 mmol/L  N  2-11  

 

 Glucose  98 mg/dL  N    

 

 Blood Urea Nitrogen  17 mg/dL  N  6-24  

 

 Creatinine  0.88 mg/dL  N  0.51-0.95  

 

 BUN/Creatinine Ratio  19.3  N  8-20  

 

 Calcium  9.5 mg/dL  N  8.6-10.3  

 

 Total Protein  6.9 g/dL  N  6.4-8.9  

 

 Albumin  4.6 g/dL  N  3.2-5.2  

 

 Globulin  2.3 g/dL  N  2-4  

 

 Albumin/Globulin Ratio  2.0  N  1-3  

 

 Total Bilirubin  0.50 mg/dL  N  0.2-1.0  

 

 Alkaline Phosphatase  61 U/L  N    

 

 Alt  30 U/L  N  7-52  

 

 Ast  28 U/L  N  13-39  

 

 Egfr Non-  65.3  N  >60  

 

 Egfr   84.0  N  >60  26









 Laboratory test  2016  [facility]  Vitamin B12  496 pg/mL  N  
180-914  27



 finding    [address]          



     [place] [address] [phone]          

 

 CBC Auto Diff  2016  [facility]  White Blood  5.4 10^3/uL  N  
3.5-10.8  



     [address]  Count        



     [place] [address] [phone]          









 Red Blood Count  3.89 10^6/uL  Low  4.0-5.4  

 

 Hemoglobin  13.5 g/dL  N  12.0-16.0  

 

 Hematocrit  40 %  N  35-47  

 

 Mean Corpuscular Volume  102 fL  High  80-97  

 

 Mean Corpuscular Hemoglobin  35 pg  High  27-31  

 

 Mean Corpuscular HGB Conc  34 g/dL  N  31-36  

 

 Red Cell Distribution Width  13 %  N  10.5-15  

 

 Platelet Count  209 10^3/uL  N  150-450  

 

 Mean Platelet Volume  8 um3  N  7.4-10.4  

 

 Abs Neutrophils  3.3 10^3/uL  N  1.5-7.7  

 

 Abs Lymphocytes  1.4 10^3/uL  N  1.0-4.8  

 

 Abs Monocytes  0.4 10^3/uL  N  0-0.8  

 

 Abs Eosinophils  0.1 10^3/uL  N  0-0.6  

 

 Abs Basophils  0.1 10^3/uL  N  0-0.2  

 

 Abs Nucleated RBC  0 10^3/uL  N    

 

 Granulocyte %  62.2 %  N  38-83  

 

 Lymphocyte %  25.5 %  N  25-47  

 

 Monocyte %  7.8 %  N  1-9  

 

 Eosinophil %  2.6 %  N  0-6  

 

 Basophil %  1.9 %  N  0-2  

 

 Nucleated Red Blood Cells %  0  N    









 Lipid Profile  2014  [facility]  Triglycerides  136 mg/dL  N 
   28



 (Trig/Chol/HDL)    [address]          



     [place] [address] [phone]          









 Cholesterol  197 mg/dL  N    29

 

 HDL Cholesterol  71.5 mg/dL  N    30

 

 LDL Cholesterol  98 mg/dL  N    31









 CBC Auto Diff  2014  [facility]  White Blood  6.6 10^3/uL  N  
4.8-10.8  



     [address]  Count        



     [place] [address] [phone]          









 Red Blood Count  3.70 10^6/uL  Low  4.0-5.4  

 

 Hemoglobin  12.6 g/dL  N  12.0-16.0  

 

 Hematocrit  37 %  N  35-47  

 

 Mean Corpuscular Volume  101 fL  High  80-97  

 

 Mean Corpuscular Hemoglobin  34 pg  High  27-31  

 

 Mean Corpuscular HGB Conc  34 g/dL  N  31-36  

 

 Red Cell Distribution Width  13 %  N  10.5-15  

 

 Platelet Count  201 10^3/uL  N  150-450  

 

 Mean Platelet Volume  9 um3  N  7.4-10.4  

 

 Abs Neutrophils  4.4 10^3/uL  N  1.5-7.7  

 

 Abs Lymphocytes  1.4 10^3/uL  N  1.0-4.8  

 

 Abs Monocytes  0.5 10^3/uL  N  0-0.8  

 

 Abs Eosinophils  0.2 10^3/uL  N  0-0.6  

 

 Abs Basophils  0.1 10^3/uL  N  0-0.2  

 

 Abs Nucleated RBC  0 10^3/uL  N    

 

 Granulocyte %  66.7 %  N  38-83  

 

 Lymphocyte %  21.2 %  Low  25-47  

 

 Monocyte %  8.2 %  N  1-9  

 

 Eosinophil %  3.0 %  N  0-6  

 

 Basophil %  0.9 %  N  0-2  

 

 Nucleated Red Blood Cells %  0  N    









 Laboratory test  2014  [facility]  Vitamin B12  480 pg/mL  N  
180-914  32



 finding    [address] [place] [address] [phone]          

 

 Comp Metabolic  2014  [facility]  Sodium  139 mmol/L  N  133-
145  



 Panel    [address] [place] [address] [phone]          









 Potassium  4.2 mmol/L  N  3.5-5.0  

 

 Chloride  105 mmol/L  N  101-111  

 

 Co2 Carbon Dioxide  28 mmol/L  N  22-32  

 

 Anion Gap  6 mmol/L  N  2-11  

 

 Glucose  93 mg/dL  N    

 

 Blood Urea Nitrogen  11 mg/dL  N  6-24  

 

 Creatinine  0.80 mg/dL  N  0.51-0.95  

 

 BUN/Creatinine Ratio  13.8  N  8-20  

 

 Calcium  9.3 mg/dL  N  8.6-10.3  

 

 Total Protein  6.8 g/dL  N  6.4-8.9  

 

 Albumin  4.3 g/dL  N  3.2-5.2  

 

 Globulin  2.5 g/dL  N  2-4  

 

 Albumin/Globulin Ratio  1.7  N  1-3  

 

 Total Bilirubin  0.50 mg/dL  N  0.2-1.0  

 

 Alkaline Phosphatase  52 U/L  N    

 

 Alt  22 U/L  N  7-52  

 

 Ast  23 U/L  N  13-39  

 

 Egfr Non-  73.2  N  >60  

 

 Egfr   94.1  N  >60  33









 CBC Auto Diff  2014  [facility]  White Blood  5.6 10^3/uL    
4.8-10.8  



     [address]  Count        



     [place] [address] [phone]          









 Red Blood Count  3.83 10^6/uL  Low  4.0-5.4  

 

 Hemoglobin  13.4 g/dL    12.0-16.0  

 

 Hematocrit  38 %    35-47  

 

 Mean Corpuscular Volume  98 fL  High  80-97  

 

 Mean Corpuscular Hemoglobin  35 pg  High  27-31  

 

 Mean Corpuscular HGB Conc  36 g/dL    31-36  

 

 Red Cell Distribution Width  13 %    10.5-15  

 

 Platelet Count  216 10^3/uL    150-450  

 

 Mean Platelet Volume  9 um3    7.4-10.4  

 

 Abs Neutrophils  3.2 10^3/uL    1.5-7.7  

 

 Abs Lymphocytes  1.7 10^3/uL    1.0-4.8  

 

 Abs Monocytes  0.5 10^3/uL    0-0.8  

 

 Abs Eosinophils  0.2 10^3/uL    0-0.6  

 

 Abs Basophils  0 10^3/uL    0-0.2  

 

 Abs Nucleated RBC  0 10^3/uL      

 

 Granulocyte %  57.1 %    38-83  

 

 Lymphocyte %  30.1 %    25-47  

 

 Monocyte %  9.2 %  High  1-9  

 

 Eosinophil %  2.9 %    0-6  

 

 Basophil %  0.7 %    0-2  

 

 Nucleated Red Blood Cells %  0.1      









 Laboratory test  2014  [facility]  TSH (Thyroid  1.51    0.34-
5.60  34



 finding    [address]  Stimulating  miu/mL      



     [place] [address]  Horm)        



     [phone]          

 

 Comp Metabolic  2014  [facility]  Sodium  137 mmol/L    133-
145  



 Panel    [address]          



     [place] [address] [phone]          









 Potassium  4.4 mmol/L    3.5-5.0  

 

 Chloride  105 mmol/L    101-111  

 

 Co2 Carbon Dioxide  27.0 mmol/L    22-32  

 

 Anion Gap  5.0 mmol/L    2-11  

 

 Glucose  107 mg/dL  High    

 

 Blood Urea Nitrogen  13 mg/dL    6-24  

 

 Creatinine  0.80 mg/dL    0.50-1.40  

 

 BUN/Creatinine Ratio  16.3    8-20  

 

 Calcium  9.3 mg/dL    8.1-9.9  

 

 Total Protein  6.2 g/dL    6.2-8.1  

 

 Albumin  4.4 g/dL    3.6-5.4  

 

 Globulin  1.8 g/dL  Low  2-4  

 

 Albumin/Globulin Ratio  2.4    1-3  

 

 Total Bilirubin  0.8 mg/dL    0.4-1.5  

 

 Alkaline Phosphatase  47 U/L      

 

 Alt  16 U/L    14-54  

 

 Ast  20 U/L    12-42  

 

 Egfr Non-  73.4    >60  

 

 Egfr   94.4    >60  35









 Lipid Profile  2014  [facility]  Triglycerides  122 mg/dL    
  



 (Trig/Chol/HDL)    [address]  [place] [address] [phone]          









 Cholesterol  206 mg/dL  High  Less than 200  

 

 HDL Cholesterol  75 mg/dL  High  40-60  36

 

 Cholesterol/HDL Ratio  2.8 Average    1-4.44  

 

 LDL Cholesterol  106.6  High  Less Than 100  37









 Laboratory test  2014  [facility]  Vitamin B12  579 pg/mL    
180-914  38



 finding    [address]  [place] [address] [phone]          

 

 Laboratory test  2012  [facility]  Surgical Pathology  RUN 
DATE:      39



 finding    [address]          



     [place] [address]    <SEE      



     [phone]    NOTE>      

 

 Lipid Profile  2012  [facility]  Triglycerides  132 mg/dL    
  



 (Trig/Chol/HDL)    [address]  [place], [place] [id number] [phone]          









 Cholesterol  210 mg/dL  High  Less than 200  

 

 HDL Cholesterol  78 mg/dL  High  40-60  40

 

 Cholesterol/HDL Ratio  2.7 AVERAGE    1-4.44  

 

 LDL Cholesterol  105.6 mg/dL  High  Less Than 100  41









 Comp Metabolic Panel  2012  [facility]  Sodium  139 mmol/L    
133-145  



     [address]  [place] [address] [phone]          









 Potassium  4.6 mmol/L    3.5-5.0  

 

 Chloride  103 mmol/L    101-111  

 

 Co2 Carbon Dioxide  29.0 mmol/L    22-32  

 

 Anion Gap  7.0 mmol/L    2-11  

 

 Glucose  97 mg/dL      

 

 Blood Urea Nitrogen  14 mg/dL    6-24  

 

 Creatinine  0.80 mg/dL    0.50-1.40  

 

 BUN/Creatinine Ratio  17.5    8-20  

 

 Calcium  9.6 mg/dL    8.1-9.9  

 

 Total Protein  6.9 GM/DL    6.2-8.1  

 

 Albumin  4.2 GM/DL    3.6-5.4  

 

 Globulin  2.7 GM/DL    2-4  

 

 Albumin/Globulin Ratio  1.6    1-3  

 

 Total Bilirubin  1.2 mg/dL    0.4-1.5  

 

 Alkaline Phosphatase  56 U/L      

 

 Alt  20 U/L    14-54  

 

 Ast  22 U/L    12-42  

 

 Egfr Non-  73.7    >60  

 

 Egfr   94.7    >60  42









 Laboratory test  2012  [facility]  TSH (Thyroid  2.22    0.34-
5.60  43



 finding    [address]  Stimulating  MIU/ML      



     [place] [address]  Horm)        



     [phone]          









 Vitamin B12  508 pg/mL    180-914  44









 CBC With  2012  [facility]  White Blood  5.2 10^3/uL    4.8-
10.8  



 Manual Diff    [address]  Count        



     [place], [place] [address] [phone]          









 Red Blood Count  3.81 10^6/uL  Low  4.0-5.4  

 

 Hemoglobin  13.1 g/dL    12.0-16.0  

 

 Hematocrit  38 %    35-47  

 

 Mean Corpuscular Volume  100 fL  High  80-97  

 

 Mean Corpuscular Hemoglobin  34 pg  High  27-31  

 

 Mean Corpuscular HGB Conc  35 g/dL    31-36  

 

 Red Cell Distribution Width  13 %    10.5-15  

 

 Platelet Count  205 10^3/uL    150-450  

 

 Mean Platelet Volume  9 um3    7.4-10.4  

 

 Abs Neutrophils  3.3 10^3/uL    1.5-7.7  

 

 Abs Lymphocytes  1.2 10^3/uL    1.0-4.8  

 

 Abs Monocytes  0.4 10^3/uL    0-0.8  

 

 Abs Eosinophils  0.2 10^3/uL    0-0.6  

 

 Abs Basophils  0.1 10^3/uL    0-0.2  

 

 Abs Nucleated RBC  0.01 10^3/uL      

 

 Neutrophil %  73.0 %    38-83  

 

 Band %  1.0 %    0-8  

 

 Lymphocytes %  19.0 %  Low  25-47  

 

 Monocytes %  4.0 %    0-13  

 

 Eosinophils %  3.0 %    0-6  

 

 Basophil %  0 %    0-2  

 

 Reactive Lymph %  0 %    0-6  

 

 Metamyelocytes %  0 %    0-2  

 

 Myelocytes %  0 %    0-1  

 

 Promyelocytes %  0 %      

 

 Blast %  0 %      

 

 Macrocytosis  1+      









 Laboratory test  2012  [facility]  Ferritin  79 NG/ML    11-
307  45



 finding    [address]          



     [place] [address] [phone]          

 

 Laboratory test  2012  [facility]  Cytology  RUN DATE:      46



 finding    [address]    / <SEE      



     [place] [address]    NOTE>      



     [phone]          

 

 Ua Routine  2012  [facility]  Ua Specific  1.020      



       Gravity        









 Ua PH  5      

 

 Ua Color  yellow      

 

 Ua Appera  clear      

 

 Ua WBC  neg      

 

 Ua Protein  trace      

 

 Ua Glucose  neg      

 

 Ua Ketones  trace      

 

 Ua Bilirubin  neg      

 

 Ua Urobilinogen  neg      

 

 Ua Nitrite  neg      

 

 Ua Occult Blood  Non Hem trace      









 CBC With Manual  10/11/2011  [facility]  White Blood  7.6 CUMM    
4.8-10.8  



 Diff    [address]  Count        



     [place] [address] [phone]          









 Red Cell Count  3.97 CUMM  Low  4.2-5.4  

 

 Hemoglobin  13.9 g/dL    12.0-16.0  

 

 Hematocrit  39 %    35-47  

 

 Mean Corpuscular Volume  98 um3  High  79-97  

 

 Mean Corpuscular Hemoglob  35 pg  High  27-31  

 

 Mean Corpuscular HGB Cone  36 g/dL    32-36  

 

 Redcell Distribution WDTH  13 %    10.5-15  

 

 Platelet Count  259 CUMM    150-450  

 

 Mean Platelet Volume  8.1 um3    7.4-10.4  

 

 Polysegmented Neutrophil  67 %    38-83  

 

 Lymphocyte  25 %    25-47  

 

 Monocyte  7 %    0-13  

 

 Eosinophil  1 %    0-6  

 

 Absolute Neutrophil Count  5.0      

 

 RBC Morphology  NORMAL      









 Laboratory test  10/11/2011  [facility]  Vitamin B12  633 pg/mL    
180-914  



 finding    [address]          



     [place] [address] [phone]          

 

 Celiac Panel  10/11/2011  [facility]  Endomysial Abs  Negative    
Negative  47



     [address]          



     [place] [address] [phone]          









 Gliadin Igg  <10.0 U    ()  48

 

 Gliadin Iga  <10.0 U    ()  49

 

 Reticulin AB  Negative    Negative  50









 Vitamin D 1,25  10/11/2011  [facility]  Vitamin D, 1,25  40 pg/mL  
  18-78  51



 And Vitamin D,2    [address]  Dihydroxy        



     [place] [address] [phone]          

 

 Vitamin D, 25  10/11/2011  [facility]  25-Hydroxy  <4.0 ng/mL    ()
  



 Hydroxy    [address]  Vitamin D2        



     [place] [address] [phone]          









 25-Hydroxy Vitamin D3  20 ng/mL    ()  

 

 25-Hydroxy Vitamin D Total  20 ng/mL  Abnormal  ()  52









 Laboratory test  10/11/2011  [facility]  TSH  2.64    0.34-5.60  



 finding    [address]    MIU/ML      



     [place] [address] [phone]          

 

 Lipid Profile  2011  [facility]  Triglyceride  210 mg/dL  
High    



 (Trig/Chol/HDL)    [address]          



     [place] [address] [phone]          









 Cholesterol  249 mg/dL  High  Less Than 200  53

 

 High Density Lipoprotein  92 mg/dL  High  40-60  54

 

 Cholesterol/HDL Ratio  2.71 AVERAGE    1-4.44  

 

 Low Density Lipoprotein  115 mg/dL  High  Less Than 100  55









 Laboratory test finding  2011  [facility]  Glucose  98 mg/dL 
     



     [address]          



     [place] [address] [phone]          









 Hemoglobin A1c  5.6 %    Less Than 6.0  56









 1  *******Because ethnic data is not always readily available,



   this report includes an eGFR for both -Americans and



   non- Americans.****



   The National Kidney Disease Education Program (NKDEP) does



   not endorse the use of the MDRD equation for patients that



   are not between the ages of 18 and 70, are pregnant, have



   extremes of body size, muscle mass, or nutritional status,



   or are non- or non-.



   According to the National Kidney Foundation, irrespective of



   diagnosis, the stage of the disease is based on the level of



   kidney function:



   Stage Description                      GFR(mL/min/1.73 m(2))



   1     Kidney damage with normal or decreased GFR       90



   2     Kidney damage with mild decrease in GFR          60-89



   3     Moderate decrease in GFR                         30-59



   4     Severe decrease in GFR                           15-29



   5     Kidney failure                       <15 (or dialysis)

 

 2  SEE RESULT BELOW



   -----------------------------------------------------------------------------
---------------



   Name:  PRESLEY CASTANON                : 1955    Attend Dr: 
Archana Mendez NP



   Acct:  [id number]  Unit: [id number]  AGE: 63            Location:  [facility]



   Re/10/19                        SEX: F             Status:    REG REF



   -----------------------------------------------------------------------------
---------------



   



   SPEC: [id number]         DEVANTE:   04/10/    SUBM DR: Archana Mendez NP



   REQ:  [id number]              RECD:   04/10/



   STATUS: COMP



   _



   SOURCE: URINE          SPDESC:



   ORDERED:  Urine Culture



   



   -----------------------------------------------------------------------------
---------------



   Procedure                         Result                         Reported   
        Site



   -----------------------------------------------------------------------------
---------------



   Urine Culture  Final                                             19-
927      ML



   No Growth (<1,000 CFU/mL)



   



   -----------------------------------------------------------------------------
---------------



   * ML - Main Lab



   .



   



   



   



   



   



   



   



   



   



   



   



   



   



   



   



   



   



   



   



   



   



   



   



   



   



   



   ** END OF REPORT **



   



   DEPARTMENT OF PATHOLOGY,  [address] [place] [address]



   Phone # [phone]      Fax #[phone]



   Moi Delgado M.D. Director     [facility] # [id number]

 

 3  Normal Range 180 to 914



   Indeterminate Range 145 to 180



   Deficient Range  <145

 

 4  Desirable: <150



   Borderline High: 150-199



   High: 200-499



   Very High: >500

 

 5  Desirable: <200



   Borderline High: 200-239



   High: >239

 

 6  Low: <40



   Desirable: 40-60



   High: >60

 

 7  Desirable: <100



   Near Optimal: 100-129



   Borderline High: 130-159



   High: 160-189



   Very High: >189

 

 8  *******Because ethnic data is not always readily available,



   this report includes an eGFR for both -Americans and



   non- Americans.****



   The National Kidney Disease Education Program (NKDEP) does



   not endorse the use of the MDRD equation for patients that



   are not between the ages of 18 and 70, are pregnant, have



   extremes of body size, muscle mass, or nutritional status,



   or are non- or non-.



   According to the National Kidney Foundation, irrespective of



   diagnosis, the stage of the disease is based on the level of



   kidney function:



   Stage Description                      GFR(mL/min/1.73 m(2))



   1     Kidney damage with normal or decreased GFR       90



   2     Kidney damage with mild decrease in GFR          60-89



   3     Moderate decrease in GFR                         30-59



   4     Severe decrease in GFR                           15-29



   5     Kidney failure                       <15 (or dialysis)

 

 9  [id number]

 

 10  SEE RESULT BELOW



   -----------------------------------------------------------------------------
---------------



   Name:  PRESLEY CASTANON                : 1955    Attend Dr: Mo Fountain MD



   Acct:  [id number]  Unit: [id number]  AGE: 62            Location:  [facility]



   Reg:   10/19/17                        SEX: F             Status:    DEP [facility]



   -----------------------------------------------------------------------------
---------------



   



   SPEC: [id number]             DEVANTE: 10/19/      [facility] DR: Mo Fountain MD



   REQ:  [id number]             RECD: 10/19/



   STATUS: SOUT



   _



   ORDERED:  Decal/2, LEVEL 3/2



   COMMENTS: [id number]



   



   FINAL DIAGNOSIS



   



   



   1.  Bone, trapezoid, left, resection:



   -- Severe degenerative osteoarthritic changes.



   



   2.   Bone, left, trapezium, excision:



   -- Severe degenerative osteoarthritic changes.



   



   



   



   PRE-OPERATIVE DIAGNOSIS



   



   Bilateral primary osteoarthritis first carpometacarpal joints.



   



   GROSS DESCRIPTION



   



   1.   The specimen is received in formalin labeled, Partial Trapezoid Left, 
and consists of a



   1.8 x 1.1 x 0.3 cm aggregate of tan-white irregular bone fragments.  
Representative



   sections, one cassette following decalcification.



   



   2.   The specimen is received in formalin labeled, Left Trapezium, and 
consists of a 3.4 x



   2.4 x 1.3 cm aggregate of tan-white irregular bone fragments.  
Representative sections, one



   cassette following decalcification.



   



   MICROSCOPIC DESCRIPTION



   



   



   Signed __________(signature on file)___________ Moi Delgado MD 10/23/
17 1312



   



   -----------------------------------------------------------------------------
---------------



   



   



   



   



   



   ** END OF REPORT **



   



   * ML=Testing performed at Main Lab



   DEPARTMENT OF PATHOLOGY,  [address] [place] [address]



   Phone # [phone]      Fax #[phone]



   Moi Delgado M.D. Director     [facility] # [id number]

 

 11  FASTING

 

 12  Desirable <150



   Borderline high 150-199



   High 200-499



   Very High >500

 

 13  Desirable <200



   Borderline high 200-239



   High >239

 

 14  Low <40



   Desirable: 40-60



   High: >60

 

 15  Desirable: <100 mg/dL



   Near Optimal: 100-129 mg/dL



   Borderline High: 130-159 mg/dL



   High: 160-189 mg/dL



   Very High: >189 mg/dL

 

 16  *******Because ethnic data is not always readily available,



   this report includes an eGFR for both -Americans and



   non- Americans.****



   The National Kidney Disease Education Program (NKDEP) does



   not endorse the use of the MDRD equation for patients that



   are not between the ages of 18 and 70, are pregnant, have



   extremes of body size, muscle mass, or nutritional status,



   or are non- or non-.



   According to the National Kidney Foundation, irrespective of



   diagnosis, the stage of the disease is based on the level of



   kidney function:



   Stage Description                      GFR(mL/min/1.73 m(2))



   1     Kidney damage with normal or decreased GFR       90



   2     Kidney damage with mild decrease in GFR          60-89



   3     Moderate decrease in GFR                         30-59



   4     Severe decrease in GFR                           15-29



   5     Kidney failure                       <15 (or dialysis)

 

 17  Normal Range 180 to 914



   Indeterminate Range 145 to 180



   Deficient Range  <145

 

 17  *******Because ethnic data is not always readily available,



   this report includes an eGFR for both -Americans and



   non- Americans.****



   The National Kid[employer] Disease Education Program (NKDEP) does



   not endorse the use of the MDRD equation for patients that



   are not between the ages of 18 and 70, are pregnant, have



   extremes of body size, muscle mass, or nutritional status,



   or are non- or non-.



   According to the [employer], irrespective of



   diagnosis, the stage of the disease is based on the level of



   kidney function:



   Stage Description                      GFR(mL/min/1.73 m(2))



   1     Kidney damage with normal or decreased GFR       90



   2     Kidney damage with mild decrease in GFR          60-89



   3     Moderate decrease in GFR                         30-59



   4     Severe decrease in GFR                           15-29



   5     Kidney failure                       <15 (or dialysis)

 

 19  *******Because ethnic data is not always readily available,



   this report includes an eGFR for both -Americans and



   non- Americans.****



   The National Kidney Disease Education Program (NKDEP) does



   not endorse the use of the MDRD equation for patients that



   are not between the ages of 18 and 70, are pregnant, have



   extremes of body size, muscle mass, or nutritional status,



   or are non- or non-.



   According to the National Kidney Foundation, irrespective of



   diagnosis, the stage of the disease is based on the level of



   kidney function:



   Stage Description                      GFR(mL/min/1.73 m(2))



   1     Kidney damage with normal or decreased GFR       90



   2     Kidney damage with mild decrease in GFR          60-89



   3     Moderate decrease in GFR                         30-59



   4     Severe decrease in GFR                           15-29



   5     Kidney failure                       <15 (or dialysis)

 

 20  SEE RESULT BELOW



   -----------------------------------------------------------------------------
---------------



   Name:  PRESLEY CASTANON                : 1955    Attend Dr: 
Archana Mendez NP



   Acct:  [id number]  Unit: [id number]  AGE: 60            Location:  [facility]



   Re16                        SEX: F             Status:    REG REF



   -----------------------------------------------------------------------------
---------------



   



   SPEC: [id number]             DEVANTE:       [facility] DR: Archana Mendez NP



   REQ:  [id number]             RECD: 



   STATUS: SOUT



   _



   ORDERED:  IMAGE ANALYSIS, HPV/Thin Prep, HPV 16/18 GENE



   



   FINAL DIAGNOSIS



   



   Negative for Intraepithelial lesion or Malignancy



   A. Ectocervical/Endocervical



   Specimen Adequacy:



   Satisfactory of evaluation



   Transformation zone component cannot be definitely identified due to



   presence of atrophy or other hormonal changes



   Patient Information:



   HPV: High risk HPV RNA testing regardless of pap results.



   HPV 16/18 Genotype for HPV pos



   Actual Specimen Date:         16



   Spec Date if unknown:       



   Pregnant?:     N



   Post Menopausal?:             Y



   Hysterectomy?:                N



   Previous Abnormal Pap Smears?:N



   



   -----------------------------------------------------------------------------
---------------



   Date     Time Test            Result   Flag (u) Normal Range



   16 HPV RNA RFLX GE Negative          Negative



   



   The high-risk HPV types detected by the assay include: 16,



   18, 31, 33, 35, 39, 45, 51, 52, 56, 58, 59, 66, and 68.



   -----------------------------------------------------------------------------
---------------



   



   



   Signed __________(signature on file)___________ COREY Maravilla(ASCP)  1515



   



   This Pap test was evaluated with the assistance of the [employer] Test Imaging 
System. Due to



   cytologic findings at the imager microscope, comprehensive manual 
rescreening by a



   Cytotechnologist may be required.



   The Pap Smear is a screening test designed to aid in the detection of 
premalignant and



   malignant conditions of the uterine cervix.  It is not a diagnostic 
procedure and should



   not be used as the sole means of detecting cervical cancer.  Both false-
positive and false-



   negative reports do occur.  Depending on your risk status, a Pap smear 
should be obtained



   and evaluated every 1-3 years.



   



   -----------------------------------------------------------------------------
---------------



   



   ** END OF REPORT **



   



   * ML=Testing performed at Main Lab



   DEPARTMENT OF PATHOLOGY,  [address] [place] [address]



   Phone # [phone]      Fax #[phone]



   Moi Delgado M.D. Director     [facility] # [id number]



   



   



   RUN DATE: 16               [facility] **LIVE**         
       PAGE    1



   



   -----------------------------------------------------------------------------
---------------



   Patient: PRESLEY CASTANON                 [id number]     (Continued)



   -----------------------------------------------------------------------------
---------------

 

 21  The high-risk HPV types detected by the assay include: 16,



   18, 31, 33, 35, 39, 45, 51, 52, 56, 58, 59, 66, and 68.

 

 22  Desirable <150



   Borderline high 150-199



   High 200-499



   Very High >500

 

 23  Desirable <200



   Borderline high 200-239



   High >239

 

 24  Low <40



   Desirable: 40-60



   High: >60

 

 25  Desirable: <100 mg/dL



   Near Optimal: 100-129 mg/dL



   Borderline High: 130-159 mg/dL



   High: 160-189 mg/dL



   Very High: >189 mg/dL

 

 26  *******Because ethnic data is not always readily available,



   this report includes an eGFR for both -Americans and



   non- Americans.****



   The National Kidney Disease Education Program (NKDEP) does



   not endorse the use of the MDRD equation for patients that



   are not between the ages of 18 and 70, are pregnant, have



   extremes of body size, muscle mass, or nutritional status,



   or are non- or non-.



   According to the National Kidney Foundation, irrespective of



   diagnosis, the stage of the disease is based on the level of



   kidney function:



   Stage Description                      GFR(mL/min/1.73 m(2))



   1     Kidney damage with normal or decreased GFR       90



   2     Kidney damage with mild decrease in GFR          60-89



   3     Moderate decrease in GFR                         30-59



   4     Severe decrease in GFR                           15-29



   5     Kidney failure                       <15 (or dialysis)

 

 27  Normal Range 180 to 914



   Indeterminate Range 145 to 180



   Deficient Range  <145

 

 28  Desirable <150



   Borderline high 150-199



   High 200-499



   Very High >500

 

 29  Desirable <200



   Borderline high 200-239



   High >239

 

 30  Low <40



   Desirable: 40-60



   High: >60

 

 31  Desirable <100



   Near Optimal 100-129



   Borderline high 130-159



   High 160-189



   Very High >189

 

 32  Normal Range 180 to 914



   Indeterminate Range 145 to 180



   Deficient Range  <145

 

 33  *******Because ethnic data is not always readily available,



   this report includes an eGFR for both -Americans and



   non- Americans.****



   The National Kidney Disease Education Program (NKDEP) does



   not endorse the use of the MDRD equation for patients that



   are not between the ages of 18 and 70, are pregnant, have



   extremes of body size, muscle mass, or nutritional status,



   or are non- or non-.



   According to the National Kidney Foundation, irrespective of



   diagnosis, the stage of the disease is based on the level of



   kidney function:



   Stage Description                      GFR(mL/min/1.73 m(2))



   1     Kidney damage with normal or decreased GFR       90



   2     Kidney damage with mild decrease in GFR          60-89



   3     Moderate decrease in GFR                         30-59



   4     Severe decrease in GFR                           15-29



   5     Kidney failure                       <15 (or dialysis)

 

 34  Fasting

 

 35  *******Because ethnic data is not always readily available,



   this report includes an eGFR for both -Americans and



   non- Americans.****



   The National Kidney Disease Education Program (NKDEP) does



   not endorse the use of the MDRD equation for patients that



   are not between the ages of 18 and 70, are pregnant, have



   extremes of body size, muscle mass, or nutritional status,



   or are non- or non-.



   According to the National Kidney Foundation, irrespective of



   diagnosis, the stage of the disease is based on the level of



   kidney function:



   Stage Description                      GFR(mL/min/1.73 m(2))



   1     Kidney damage with normal or decreased GFR       90



   2     Kidney damage with mild decrease in GFR          60-89



   3     Moderate decrease in GFR                         30-59



   4     Severe decrease in GFR                           15-29



   5     Kidney failure                       <15 (or dialysis)

 

 36  HDL Interpretation:



   Undesirable: High Risk:  Less than 40 mg/dL



   Desirable:  Low Risk:  Greater than 60 mg/dL

 

 37  LDL Interpretation:



   Low Risk Optimal Level:  LDL Less than 100 mg/dL



   Near or Above Optimal:  -129 mg/dL



   Borderline High Risk:  -159 mg/dL



   High Risk:  -189 mg/dL



   Very High Risk:  LDL Greater than 189 mg/dL

 

 38  Fasting

 

 39  RUN DATE: 12               [facility] LAB **LIVE**       
         PAGE    1



   RUN TIME: 6930             [address], [place]   [address]



   Specimen Inquiry



   



   -----------------------------------------------------------------------------
---------------



   Name:  PRESLEY CASTANON                : 1955    Attend Dr: Anne HERNADEZ
Lorrie



   Acct:  [id number]  Unit: [id number]  AGE: 57            Location:  [facility]



   Re12                        SEX: F             Status:    REG REF



   -----------------------------------------------------------------------------
---------------



   



   SPEC: [id number]             DEVANTE:       SUBM DR: Lorrie Rodríguez MD



   REQ:  [id number]             RECD: 124



   STATUS: SOUT



   _



   ORDERED:  LEVEL IV



   



   FINAL DIAGNOSIS



   



   



   Skin, right thigh, punch biopsy:



   A. Lentiginous compound melanocytic nevus with mild architectural disorder 
and no



   cytologic atypia.



   B. The lesion appears excised.



   



   



   



   



   CLINICAL HISTORY



   



   No history given.



   



   GROSS DESCRIPTION



   



   The specimen is received in formalin labelled Presley Castanon and consists of 
a punch biopsy



   measuring 0.4 x 0.4 x 0.4 cm. The specimen is bisected and submitted entirely
, one cassette.



   



   Signed __________(signature on file)___________ Moi Delgado MD  1330



   



   -----------------------------------------------------------------------------
---------------



   



   



   



   



   



   



   



   



   



   



   



   



   



   



   ** END OF REPORT **



   



   * ML=Testing performed at Main Lab



   DEPARTMENT OF PATHOLOGY,  [address] [place] [address]



   Phone # [phone]      Fax #[phone]



   Moi Delgado M.D. Director     [place] Permit  #[id number]

 

 40  HDL Interpretation:



   Undesirable: High Risk:  Less than 40 MG/DL



   Desirable:  Low Risk:  Greater than 60 MG/DL

 

 41  LDL Interpretation:



   Low Risk Optimal Level:  LDL Less than 100 MG/DL



   Near or Above Optimal:  -129 MG/DL



   Borderline High Risk:  -159 MG/DL



   High Risk:  -189 MG/DL



   Very High Risk:  LDL Greater than 189 MG/DL

 

 42  *******Because ethnic data is not always readily available,



   this report includes an eGFR for both -Americans and



   non- Americans.****



   The National Kidney Disease Education Program (NKDEP) does



   not endorse the use of the MDRD equation for patients that



   are not between the ages of 18 and 70, are pregnant, have



   extremes of body size, muscle mass, or nutritional status,



   or are non- or non-.



   According to the National Kidney Foundation, irrespective of



   diagnosis, the stage of the disease is based on the level of



   kidney function:



   Stage Description                      GFR(mL/min/1.73 m(2))



   1     Kidney damage with normal or decreased GFR       90



   2     Kidney damage with mild decrease in GFR          60-89



   3     Moderate decrease in GFR                         30-59



   4     Severe decrease in GFR                           15-29



   5     Kidney failure                       <15 (or dialysis)

 

 43  Fasting

 

 44  Fasting

 

 45  Fasting

 

 46  RUN DATE: 12               [facility] **LIVE**       
         PAGE    1



   RUN TIME: 1232             [address], [place]   [address]



   Specimen Inquiry



   



   -----------------------------------------------------------------------------
---------------



   Name:  PRESLEY CASTANON                : 1955    Attend Dr: Lorrie Rodríguez MD



   Acct:  [id number]  Unit: [id number]  AGE: 57            Location:  [facility]



   Re12                        SEX: F             Status:    REG REF



   -----------------------------------------------------------------------------
---------------



   



   SPEC: [id number]            DEVANTE:       [facility] DR: Anne HERNADEZLorrie



   REQ:  [id number]             RECD: 



   STATUS: SOUT



   _



   ORDERED:  IMAGE ANALYSIS



   Negative for Intraepithelial lesion or Malignancy



   A. Ectocervical/Endocervical



   Specimen Adequacy:



   Satisfactory of evaluation



   Transformation zone component cannot be definitely identified due to



   presence of atrophy or other hormonal changes



   Patient Information:



   HPV: Thin Layer Pap Test w/reflex to high risk HPV DNA testing when ASCUS



   Actual Specimen Date:         12



   LMP If Unknown:               age 49



   Cautery:   N



   IUD:       N



   Pregnant?: N



   Post Menopausal?:             Y



   Hysterectomy?:                N



   Lesion, grossly demonstrate:  N



   Previous Abnormal Pap Smears?:N



   



   Signed __________(signature on file)___________ COREY Sharma (ASCP)  1232



   



   This Pap test was evaluated with the assistance of the [employer] Test Imaging 
System. Due to



   cytologic findings at the imager microscope, comprehensive manual 
rescreening by a



   Cytotechnologist may be required.



   The Pap Smear is a screening test designed to aid in the detection of 
premalignant and



   malignant conditions of the uterine cervix.  It is not a diagnostic 
procedure and should



   not be used as the sole means of detecting cervical cancer.  Both false-
positive and false-



   negative reports do occur.  Depending on your risk status, a Pap smear 
shoudl be obtained



   and evaluated every 1-3 years.



   



   -----------------------------------------------------------------------------
---------------



   



   



   



   



   



   



   



   



   ** END OF REPORT **



   



   * ML=Testing performed at Main Lab



   DEPARTMENT OF PATHOLOGY,  [address] [place] [address]



   Phone # [phone]      Fax #[phone]



   Moi Delgado M.D. Director     [place] Permit  #[id number]

 

 47  Analyte Specific Reagent



   This test was developed and its performance characteristics



   determined by Laboratory Medicine and Pathology, [facility]. It has not been cleared or



   approved by the U.S. Food and Drug Administration.



   



   Test Performed by:



   [facility] Dpt of Lab Med and Pathology



   [address] [place] [address]



   : Klaus Samuel III, M.D.

 

 48  -- REFERENCE VALUE --



   <20.0 (Negative)



   



   Test Performed by:



   [facility] Dpt of Lab Med and Pathology



   [address] [place] [address]



   : Klaus Samuel III, M.D.

 

 49  -- REFERENCE VALUE --



   <20.0 (Negative)



   



   Test Performed by:



   [facility] Dpt of Lab Med and Pathology



   [address] [place] [address]



   : Klaus Samuel III, M.D.

 

 50  Test Performed by:



   [facility] Dpt of Lab Med and Pathology



   [address] [place] [address]



   : Klaus Samuel III, M.D.

 

 51  Test Performed by:



   [facility] Dpt of Lab Med and Pathology



   [address] [place] [address]



   : Klaus Samuel III, M.D.

 

 52  Interpretation: 10-24 (mild to moderate deficiency)



   -- REFERENCE VALUE --



   25-HYDROXY D TOTAL (D2+D3)



   Optimum levels in the normal



   population are 25-80



   



   Test Performed by:



   [facility] Dpt of Lab Med and Pathology



   [address] [place] [address]



   : Klaus Samuel III, M.D.

 

 53  CHOLESTEROL INTERPRETATION:



   Desirable:  Less than 200 MG/DL



   Borderline-High Risk:  200-239 MG/DL



   High-Risk:  240 MG/DL and over

 

 54  HDL INTERPRETATION:



   Undesirable: High Risk:  Less than 40 MG/DL



   Desirable:  Low Risk:  Greater than 60 MG/DL

 

 55  LDL INTERPRETATION:



   Low Risk Optimal Level:  LDL Less than 100 MG/DL



   Near or Above Optimal:  -129 MG/DL



   Borderline High Risk:  -159 MG/DL



   High Risk:  -189 MG/DL



   Very High Risk:  LDL Greater than 189 MG/DL

 

 56  THERAPEUTIC TARGET FOR THE TREATMENT OF DIABETES



   MELLITUS PATIENTS IS <7% HBA1C, AND IN SELECTIVE



   PATIENTS <6.0%.  PLEASE REFER TO [employer]



   ASSOCIATION DIABETIC CARE GUIDELINES FOR FURTHER



   INFORMATION.







Procedures







 Date  Code  Description  Status

 

 04/10/2019  57572  EKG Tracing & Interpretation  Completed

 

 201910  Inject/Drain Joint/Bursa Major W/O US  Completed

 

 201810  Inject/Drain Joint/Bursa Major W/O US  Completed

 

 12/10/2018  42158  Inject/Drain Joint/Bursa Major W/O US  Completed

 

 201810  Inject/Drain Joint/Bursa Major W/O US  Completed

 

 201810  Inject/Drain Joint/Bursa Major W/O US  Completed

 

 10/26/2018  16196  Inject/Drain Joint/Bursa Major W/O US  Completed

 

 10/04/2018  [id number]  Mammogram  Completed

 

 2018  69492882  Colonoscopy  Completed

 

 10/31/2017  41804  Short Arm Splint Application  Completed

 

 10/19/2017  32475  Capsulodesis Metacarpophalangeal Joint, Single Digit  
Completed

 

 10/19/2017  91812  Capsulodesis Metacarpophalangeal Joint, Single Digit  
Completed

 

 10/19/2017  98123  Arthroplasty Interposition Intercarpal Or  Completed



     Carpometacarpal JTS  

 

 10/19/2017  92386  Arthroplasty Interposition Intercarpal Or  Completed



     Carpometacarpal JTS  

 

 2017  63399625  Mammogram  Completed

 

 2016  70870  EKG Tracing & Interpretation  Completed

 

 2016  36204829  Mammogram  Completed

 

 01/15/2015  60528025  Mammogram  Completed

 

 2014  92654  Admin Of Inj  Completed

 

 2014  86760656  Mammogram  Completed

 

 2013  02222  EKG Tracing & Interpretation  Completed

 

 2013  50442231  Colonoscopy  Completed

 

 2012  58736  Excise Benign Lesion including margins <.6CM  Completed



     Trunk/Arm/Leg  

 

 2012  88425723  Mammogram  Completed

 

 2012  46573  EKG Tracing & Interpretation  Completed

 

 10/11/2011  10895387  Mammogram  Completed

 

 10/11/2011  153126396  Bone Mineral Density Test  Completed

 

 2011  23918  EKG Tracing & Interpretation  Completed

 

 2011  18815  Stress Test Supervsn W/Out I/R  Completed

 

 2011  61311  Treadmill Interp/Report Only  Completed

 

 2010  77938227  Mammogram  Completed

 

 2010  29791  EKG Tracing & Interpretation  Completed

 

 2008  16766  EKG Tracing & Interpretation  Completed

 

 09/10/2007  48581076  Colonoscopy  Completed

 

 2007  618256177  Bone Mineral Density Test  Completed

 

 2007  42026  EKG Tracing & Interpretation  Completed

 

 2007  68536  EKG Tracing & Interpretation  Completed







Encounters







 Type  Date  Location  Provider  Dx  Diagnosis

 

 Office Visit  2019  Orthopedic  Ava Brody,  M25.562  Pain in left knee



   9:15a  [facility] [place].  M.D.    









 M25.561  Pain in right knee

 

 M25.462  Effusion, left knee

 

 M25.461  Effusion, right knee

 

 M17.0  Bilateral primary osteoarthritis of knee









 Office Visit  03/15/2019  3:20p  [facility]  Archana Mendez,  F32.9  Major 
depressive



     Medicine -  N.P.    disorder, single



     [place]      episode,



           unspecified









 M25.562  Pain in left knee









 Office Visit  2019  [facility] Danni Mendez,  I10  Essential



   4:00p  Medicine -  N.P.    (primary)



     [place]      hypertension

 

 Office Visit  2018  Orthopedic  Ava Brody,  M25.561  Pain in right 
knee



   3:15p  [facility]  M.D.    



     C.M.AUlices      









 M25.562  Pain in left knee

 

 M25.461  Effusion, right knee

 

 M25.462  Effusion, left knee

 

 M17.0  Bilateral primary osteoarthritis of knee









 Office Visit  10/26/2018 11:00a  [facility] Services  Ava Brody,  M25.561  
Pain in right



     Of C.M.A.  M.D.    knee









 M25.562  Pain in left knee

 

 M25.461  Effusion, right knee

 

 M25.462  Effusion, left knee

 

 M17.0  Bilateral primary osteoarthritis of knee









 Office Visit  2018  3:00p  [facility] Danni Mendez,  Z00.00  Encntr 
for



     Medicine  N.P.    general adult



           medical exam



           w/o abnormal



           findings









 Z12.31  Encntr screen mammogram for malignant neoplasm of breast

 

 E78.00  Pure hypercholesterolemia, unspecified

 

 I10  Essential (primary) hypertension

 

 G43.909  Migraine, unsp, not intractable, without status migrainosus

 

 D51.9  Vitamin B12 deficiency anemia, unspecified

 

 K21.9  Gastro-esophageal reflux disease without esophagitis

 

 M25.569  Pain in unspecified knee

 

 M79.641  Pain in right hand

 

 M25.561  Pain in right knee

 

 M25.562  Pain in left knee









 Office Visit  2018  Orthopedic  Mo  M18.12  Unil primary



   3:30p  Services Of  MD Александр    osteoarth of first



     C.M.A.      carpometacarp



           joint, l hand

 

 Office Visit  2017  Orthopedic  Mo  M18.0  Bilateral primary



   1:00p  Services Of  MD Александр    osteoarth of first



     C.M.A.      carpometacarp



           joints

 

 Office Visit  2017  [facility]  Archana Mendez,  Z00.01  Encounter for



   10:00a  Medicine  N.P.    general adult



           medical exam w



           abnormal findings









 Z12.31  Encntr screen mammogram for malignant neoplasm of breast

 

 I10  Essential (primary) hypertension

 

 E78.00  Pure hypercholesterolemia, unspecified

 

 K21.9  Gastro-esophageal reflux disease without esophagitis

 

 D51.9  Vitamin B12 deficiency anemia, unspecified

 

 M25.541  Pain in joints of right hand

 

 M25.542  Pain in joints of left hand

 

 G43.909  Migraine, unsp, not intractable, without status migrainosus









 Office Visit  2017  8:40a  [facility]  Archana Mendez,  I10  Essential (
primary)



     Medicine  N.P.    hypertension

 

 Office Visit  2016 11:00a  [facility]  Harvinder Larios NP  Z01.818  
Encounter for other



     Medicine      preprocedural



           examination









 M77.42  Metatarsalgia, left foot

 

 I10  Essential (primary) hypertension

 

 K21.9  Gastro-esophageal reflux disease without esophagitis









 Office Visit  2016  [facility]  Archana  E78.0  Pure 
hypercholesterolemia



   9:20a  Medicine  Varn, N.P.    









 Z12.39  Encounter for oth screening for malignant neoplasm of breast

 

 I10  Essential (primary) hypertension

 

 K21.9  Gastro-esophageal reflux disease without esophagitis

 

 D51.8  Other vitamin B12 deficiency anemias

 

 Z01.419  Encntr for gyn exam (general) (routine) w/o abn findings

 

 H81.13  Benign paroxysmal vertigo, bilateral









 Office Visit  2015  8:40a  [facility]  Archana Mendez,  401.1  
Hypertension Benign



     Medicine  N.P.    









 311  Depressive Disorder Not Elsewhere Spec

 

 309.0  Adjustment Disorder With Depression









 Office Visit  2015  9:00a  [facility]  Archana Mendez,  V70.0  
Examination



     Medicine  N.P.    General Medical



           Routine [facility]



           Care Facility









 V76.10  Screening For Malignant Neoplasm Breast

 

 401.1  Hypertension Benign

 

 272.4  Hyperlipidemia Other Unspec

 

 530.81  Esophageal Reflux

 

 281.1  Vitamin B12 Deficiency Anemia Other

 

 110.1  Dermatophytosis Nail

 

 782.1  Rash & Other Nonspec Skin Eruption

 

 782.9  Skin & Integumentary Tissue Other Symptoms









 Office Visit  2013  2:20p  [facility]  Lorrieza Rodríguez,  V70.0  Examination



     Medicine  M.D., FACP    General Medical



           Routine AT Health



           Care Facility









 V76.10  Screening For Malignant Neoplasm Breast

 

 401.1  Hypertension Benign

 

 272.2  Hyperlipidemia Mixed

 

 281.1  Vitamin B12 Deficiency Anemia Other

 

 477.9  Rhinitis Allergic Cause Unspec









 Office Visit  2012  [facility]  Lorrie Rodríguez,  272.0  
Hypercholesterolemia Pure



   4:00p  Medicine  M.D., FACP    









 238.2  Neoplasm Uncertain Skin

 

 239.2  Neoplasm Unspecified Bone & Soft Tissue









 Office Visit  2012 10:40a  [facility]  Lorrie Rodríguez,  V70.0  Examination



     Medicine  M.D., FACP    General Medical



           Routine AT Health



           Care Facility









 V72.31  Routine Gyn Examination

 

 V76.10  Screening For Malignant Neoplasm Breast

 

 401.1  Hypertension Benign

 

 272.2  Hyperlipidemia Mixed

 

 281.1  Vitamin B12 Deficiency Anemia Other

 

 477.9  Rhinitis Allergic Cause Unspec

 

 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

 

 709.8  Skin Disorders Other Spec









 Office Visit  10/20/2011  3:40p  DO Not Use  Lorrie Anne,  281.1  Vitamin B12



     [facility]    Deficiency



           Anemia Other









 733.90  Bone & Cartilage Disorder Unspec









 Office Visit  2011  1:00p  DO Not Use  Lorrie Anne,  V70.0  Examination



     [facility] F[facility]    General Medical



           Routine AT Health



           Care Facility









 272.2  Hyperlipidemia Mixed

 

 401.1  Hypertension Benign

 

 790.21  Impaired Fasting Glucose

 

 281.1  Vitamin B12 Deficiency Anemia Other

 

 627.9  Menopausal & Postmenopausal Disorder Unspec

 

 V04.81  Need For Prophylactic Vaccination & Inoculation/Influenza









 Office Visit  2011  DO Not Use  [facility]  719.47  Pain Joint Ankle &



   4:00p  [facility]  Varn, N.P.    Foot

 

 Office Visit  2011  DO Not Use  Lorrie Anne,  272.2  Hyperlipidemia



   4:00p  Martha BEY, FACP    Mixed

 

 Office Visit  2011  DO Not Use  Lorrie Anne,  272.2  Hyperlipidemia



   3:45p  Martha BEY, FACP    Mixed

 

 Office Visit  2011  [facility]  Laith BARNES  786.50  Pain Chest 
Unspec



   11:00a    MAIDA Hart    









 272.0  Hypercholesterolemia Pure

 

 401.1  Hypertension Benign









 Office  2011  DO Not Use  Lorrie  786.50  Pain Chest Unspec



 Visit  2:30p  Martha Rodríguez M.D., FACP    

 

 Office  10/21/2010  DO Not Use  Lorrie  272.1  Hypertriglyceridemia



 Visit  9:15a  Renzo Baird M.D., FACP    









 V04.81  Need For Prophylactic Vaccination & Inoculation/Influenza









 Office Visit  2010  DO Not Use  Lorrie Anne,  V72.31  Routine Gyn



   2:00p  Martha BEY, FACP    Examination

 

 Office Visit  2010  DO Not Use  Lorrie Anne,  782.0  Skin Sensation



   11:15a  Martha BEY, FACP    Disturbance

 

 Office Visit  2009  DO Not Use  Lorrie Anne,  054.9  Herpes Simplex W/O



   2:30p  Martha BEY, FACP    Complication

 

 Office Visit  09/15/2009  DO Not Use  Lorrie Anne,  277.7  Dysmetabolic



   1:15p  Martha BEY, FACP    Syndrome X









 401.1  Hypertension Benign









 Office Visit  2009  9:15a  DO Not Use  Lorrie Anne,  V70.0  Examination



     Martha BEY, FACP    General Medical



           Routine AT Health



           Care Facility









 272.4  Hyperlipidemia Other Unspec

 

 401.1  Hypertension Benign









 Office Visit  2009  DO Not Use  [facility]chan[facility]  681.02  Onychia &



   4:00p  [facility]  Varn, N.P.    Paronychia Finger

 

 Office Visit  2009  DO Not Use  Archana  681.02  Onychia &



   8:30a  [facility], N.P[facility]    Paronychia Finger

 

 Office Visit  12/10/2008  DO Not Use  Lorrie Anne,  681.02  Onychia &



   2:00p  Martha BEY, FACP    Paronychia Finger

 

 Office Visit  2008  DO Not Use  Lorrie Anne,  272.4  Hyperlipidemia



   3:30p  Martha BEY, FACP    Other Unspec









 627.2  Menopausal Or Female Climacteric State, Symptomatic

 

 401.1  Hypertension Benign









 Office Visit  2008  9:15a  DO Not Use  Lorrieza Rodríguez  V72.31  Routine Gyn



     Martha BEY, FACP    Examination









 285.9  Anemia Unspec

 

 379.93  Redness Or Discharge Of Eye

 

 401.1  Hypertension Benign

 

 V06.1  Diphtheria-Tetanus-Pertusis Combined (DTaP)









 Office  2008  DO Not Use  Lorrie  720.2  Sacroiliitis Not



 Visit  3:45p  Martha Rodríguez M.D.,    Elsewhere Classified



       FACP    

 

 Office  2008  DO Not Use  Lorrie  272.0  Hypercholesterolemia



 Visit  9:00a  Martha Rodríguez M.D.,    Pure



       FACP    

 

 Office  2008  DO Not Use  Lorrie  272.0  Hypercholesterolemia



 Visit  2:45p  Martha Rodríguez M.D.,    Pure



       FACP    









 401.1  Hypertension Benign









 Office  11/15/2007  DO Not Use  Rizwan,  723.1  Cervicalgia



 Visit  1:15p  Martha Ruiz M.D.    

 

 Office  2007  DO Not Use  Lorrie Anne,  272.0  Hypercholesterolemia



 Visit  9:00a  Martha BEY, FACP    Pure

 

 Office  2007  DO Not Use  Archana  110.1  Dermatophytosis Nail



 Visit  3:45p  Martha Mendez, N.P.    









 627.2  Menopausal Or Female Climacteric State, Symptomatic









 Office Visit  2007  9:15a  DO Not Use  Lorrieza Rodríguez,  V72.31  Routine Gyn



     Martha BEY, FACP    Examination









 401.1  Hypertension Benign







Plan of Treatment

Future Appointment(s):2019 12:00 pm - Shiv Thomas PA-C at [facility]2019 12:00 pm - AMAURY Zaragoza at [facility].2019 12:00 pm - Ava Brody M.D. at [facility]09/10/2019  9:20 am - Archana Mendez NNEISHA at [facility] 
[place]21/15/2019 - Ava Brody M.D.M25.561 Pain in right kneeFollow up:
Follow up:   2 weeks after rlrevwwD24.461 Effusion, right kneeM17.0 Bilateral 
primary osteoarthritis of knee

## 2019-04-25 NOTE — OP
Operative Report - Blank





- Operative Report


Date of Operation: 04/25/19


Note: 





PRESLEY CASTANON


1955





Date of Surgery: 4/25/19





Ava Brody MD





Assistant: Tonya REBOLLEDO did help throughout the procedure with preparation 

of the knee, wound retraction, manipulation of the knee, and wound closure.  





Anesthesiologist: CARIDAD Luna MD





Anesthesia Type: General





Preoperative Diagnosis: Right severe degenerative osteoarthritis of the knee





Postoperative Diagnosis: As above





Procedure Performed: Right Total Knee Arthroplasty





Tourniquet time: 41 minutes





Complications: None





Specimen: Bone and cartilage from the right knee joint sent to pathology.  





Hardware Used: Cemented Smith and [employer]hew total knee hardware was used - For the 

femur a size 5 narrow right oxinium legion posterior stabilized femoral 

component, for the tibia a size 3 right nery II tibial baseplate, for the 

insert a size 9mm 3-4 posterior stabilized articular polyethylene insert, and 

for the patella a size 29 3-peg all poly patella.  





Brief History/Indication: PRESLEY CASTANON was known in clinic and had a 

history of severe right knee pain and swelling. She failed conservative 

treatment with anti-inflammatories, pain pills, intra-articular injections and 

physical therapy.  She elected to undergo right total knee arthroplasty due to 

continued pain and decreased quality of life.  Radiographs showed severe end 

stage osteoarthritis of the knee with bone on bone contact.  Informed consent 

was obtained from the patient.  She understood the risks of surgery included 

but were not limited to: bleeding, infection, damage to nearby structures, 

intraoperative fracture, nerve palsy, failure of the hardware, early loosening, 

knee stiffness or loss of motion, anesthesia complications, stroke, heart attack

, blood clot and death.  She wished to proceed.





Intra-Operative Findings: Intraoperatively the patient was noted to have severe 

loss of cartilage in all 3 compartments of the knee.  





Description of the Procedure: 





PRESLEY CASTANON was identified in the preanesthesia unit. Her right knee was 

marked as the correct operative side.  Informed consent was signed and placed 

in the chart. The patient was taken to the operating room and placed under 

anesthesia without complication. A ruiz catheter was placed. A tourniquet was 

placed on the right thigh. The right lower extremity was prepped and draped in 

the usual sterile fashion. Preoperative time-out was made to correctly identify 

the patient, side and site. Appropriate intraoperative antibiotics were given 

within one hour of incision.





Tourniquet was inflated. A midline incision was made and carried sharply down 

to the extensor mechanism. A new 10 blade was used to make a standard medial 

parapatellar arthrotomy. The patella was subluxed laterally. Electrocautery was 

used to dissect soft tissue off the superomedial tibia to the midsagittal 

plane.  The knee was flexed up. The anterior horn of the lateral meniscus and 

the ACL were sharply incised. A drill was used to enter the distal femur. The 

intramedullary distal femoral cutting guide was pinned on the distal femur. The 

oscillating saw was used to make the distal femoral cut.





The external rotation guide was pinned on the distal femur and the distal femur 

was sized to a size 5. The size 5 multi-cutting jig was pinned on the distal 

femur. The oscillating saw was used to make the appropriate 4 chamfer cuts.  

Next the PCL was completely released.  The extramedullary tibial cutting guide 

was pinned on the proximal tibia and the oscillating saw was used to make the 

proximal tibial cut perpendicular to the mechanical axis of the tibia. The bone 

was carefully removed.





The knee was brought out into full extension. The spacer block was placed and 

had excellent fit with the knee in full extension. The medial and lateral 

ligaments were well balanced. The flexion and extension gaps were well 

balanced. The knee was flexed up. Lamina  was placed both medially and 

laterally. Any remaining meniscus was removed with electrocautery.  Curved 

osteotome was used to remove any posterior osteophytes. The tibial tray and 

drop thor were placed and confirmed a satisfactory tibial cut.





The size 5 right narrow femoral trial was impacted onto the distal femur. This 

trial had excellent fit and stability. The box for the posterior stabilized 

implant was prepared using a box cut osteotome and a reamer. Next a tibial tray 

trial and 9 mm insert trial was placed. The knee was taken through a range of 

motion and had full extension to 130 degrees of flexion. Patellofemoral 

tracking was satisfactory.





The patella was inverted and sized to a size 29. Three peg holes were drilled 

through the size 29 drill guide. The trial patella was placed and the knee was 

taken through a range of motion. There was satisfactory patellofemoral 

tracking. All trials were removed. The tibia was subluxed anteriorly and sized 

to a size 3. The proximal tibial was prepared with a size 3 keel punch.  All 

bony cut surfaces were irrigated with sterile saline and dried. Final implants 

were cemented into place starting with the tibia, followed by the femur, and 

last the patella. A 9 mm insert trial was placed and the knee was brought into 

full extension.





Tourniquet was turned down and the knee was copiously irrigated with sterile 

saline. Electrocautery was used to obtain meticulous hemostasis. Once the 

cement had fully cured, the insert trial was removed. Any excess cement was 

removed from around the hardware and capsule.  Final insert chosen was a 9 mm 

posterior stabilized Nery II articular insert size 3-4. Stability of the 

insert was checked and noted to be stable.





The extensor mechanism was closed using number 1 vicryls. The rest of the 

incision was closed in a layered fashion using 0 and 2-0 vicryls. The skin was 

closed using 3-0 nylon suture. Sterile xeroform, 4x4s and webril were used to 

cover the incision.  Ace wrap and cold pack were used to cover the dressings. 

The patients anesthesia was reversed without difficulty. She was taken to the 

PACU in stable condition.  Intended weight-bearing will be as tolerated.

## 2019-04-25 NOTE — CONS
CONSULTATION REPORT:

 

DATE OF CONSULT:  19

 

CONSULTING PROVIDER:  Osei Hernandez MD.

 

REQUESTING PHYSICIAN:  Dr. Brody of orthopedics.*

 

REASON FOR CONSULT:  Hypertension in the setting of elective right total knee 
replacement.

 

CHIEF COMPLAINT:  Right knee pain.

 

HISTORY OF PRESENT ILLNESS:  Terra Shepherd is a 63-year-old female with past 
medical history of hypertension, migraine headaches, hyperlipidemia, 
preexcitation/Pili-Parkinson-White pattern to EKGs, and end-stage right knee 
osteoarthritis who is presenting for elective total right knee replacement with 
Dr. Brody.  On day of consultation, she has just worked with physical therapy, 
transferring from the bed to the chair, has some mild burning pain near the knee
, but otherwise well controlled.  She is without any other complaints.  She 
denies any history of palpitations.  She does exercise with walking.  Denies 
any chest pain.

 

PAST MEDICAL HISTORY:  Hypertension, depression, vitamin D deficiency, Pili- 
Parkinson-White/preexcitation on EKG, hyperlipidemia, end-stage right knee 
osteoarthritis; now status post right total knee replacement.

 

PAST SURGICAL HISTORY:  , left foot bunionectomy and another surgery 
on the left foot as well as on the left first finger.

 

MEDICATIONS AT HOME:  Included:

1.  Atenolol 50 mg p.o. q.a.m.

2.  Vitamin B12 at 1000 mg IM monthly.

3.  Lexapro 10 mg p.o. q.a.m.

4.  Fenofibrate 54 mg p.o. q.a.m.

5.  Simvastatin 10 mg p.o. q.a.m.

 

ALLERGIES:  ADHESIVE TAPE, welts, redness.

 

FAMILY HISTORY:  Father  of colon cancer at age 59.  Mother had a history 
of 2 coronary artery bypasses, but  at age 83.  She has a brother and 
sister relatively healthy.

 

SOCIAL HISTORY:  She is a former smoker, 10 to 15 total pack-years, quit at age 
29. Denies drug use.  Two glasses of wine daily.  She is a full code.  
Occupation was banking.  Medical surrogate is , Gaurav Shepherd.

 

REVIEW OF SYSTEMS:  A complete 14-point review of systems is negative, except 
as per HPI.

 

PHYSICAL EXAM:  General Appearance:  In no acute distress, sitting in chair.  
Vital Signs:  Temperature 98.6, heart rate 64, respiratory rate 18, satting 99% 
on 2 L, blood pressure 155/90.  HEENT:  Normocephalic, atraumatic.  Pupils 
equally round and reactive to light.  Extraocular motions are intact.  No 
scleral icterus. Lungs:  Clear to auscultation bilaterally with no wheezing, 
rales, or rhonchi. Cardiovascular:  Regular rate and rhythm.  No murmurs, rubs, 
or gallops.  Abdomen: Soft, nontender, nondistended.  No rebound, no guarding.  
No Dia's sign. Extremities:  Warm, well perfused.  No peripheral edema.  
Right leg in brace, thought to have ice machine applied.  Sensation intact.  
Moving all extremities. Skin:  No lesions, no rashes.

 

DIAGNOSTIC STUDIES/LAB DATA: Labs:  None.

 

Imaging:  Knee x-ray demonstrates right knee replacement in satisfactory 
position.

 

ASSESSMENT AND PLAN:  Terra Shepherd is a 63-year-old female with past medical 
history of hypertension, Pili-Parkinson-White on EKG; but never with any 
palpitations, hyperlipidemia, vitamin B12 deficiency; now status post right 
total knee replacement.  We are consulted for management of her hypertension.  
I will continue her atenolol 50 mg daily.  Per Dr. Heredia's cardiac clearance 
exam, if patient were to develop palpitations, evidence of atrial fibrillation 
or rapid ventricular response, consideration would be for IV procainamide and/
or cardioversion.  Follow up labs in the morning.  Would recommend checking 
magnesium level with that in addition to the already ordered BMP.  For her 
right knee osteoarthritis, end-stage, continue management as per orthopedics 
with pain control, bowel regimen, physical therapy, and DVT prophylaxis; they 
are using Eliquis 2.5 mg p.o. b.i.d.

 

Thank you for this interesting consult.  Please call us with any questions.

 

 [id number]/CPS #: [id number]

SURAJ

## 2019-04-26 LAB
ANION GAP SERPL CALC-SCNC: 6 MMOL/L (ref 2–11)
BUN SERPL-MCNC: 9 MG/DL (ref 6–24)
BUN/CREAT SERPL: 11.4 (ref 8–20)
CALCIUM SERPL-MCNC: 9 MG/DL (ref 8.6–10.3)
CHLORIDE SERPL-SCNC: 106 MMOL/L (ref 101–111)
GLUCOSE SERPL-MCNC: 118 MG/DL (ref 70–100)
HCO3 SERPL-SCNC: 27 MMOL/L (ref 22–32)
HCT VFR BLD AUTO: 29 % (ref 33–41)
HGB BLD-MCNC: 9.9 G/DL (ref 12–16)
MAGNESIUM SERPL-MCNC: 2.2 MG/DL (ref 1.9–2.7)
PLATELET # BLD AUTO: 172 10^3/UL (ref 150–450)
POTASSIUM SERPL-SCNC: 4.2 MMOL/L (ref 3.5–5)
SODIUM SERPL-SCNC: 139 MMOL/L (ref 135–145)

## 2019-04-26 RX ADMIN — ACETAMINOPHEN SCH: 325 TABLET ORAL at 01:31

## 2019-04-26 RX ADMIN — APIXABAN SCH MG: 2.5 TABLET, FILM COATED ORAL at 21:37

## 2019-04-26 RX ADMIN — ATENOLOL SCH MG: 50 TABLET ORAL at 08:30

## 2019-04-26 RX ADMIN — TRAMADOL HYDROCHLORIDE PRN MG: 50 TABLET, FILM COATED ORAL at 15:21

## 2019-04-26 RX ADMIN — CEFAZOLIN SCH MLS/HR: 1 INJECTION, POWDER, FOR SOLUTION INTRAVENOUS at 11:27

## 2019-04-26 RX ADMIN — SODIUM CHLORIDE, SODIUM LACTATE, POTASSIUM CHLORIDE, AND CALCIUM CHLORIDE SCH MLS/HR: 600; 310; 30; 20 INJECTION, SOLUTION INTRAVENOUS at 01:29

## 2019-04-26 RX ADMIN — TRAMADOL HYDROCHLORIDE PRN MG: 50 TABLET, FILM COATED ORAL at 08:31

## 2019-04-26 RX ADMIN — MAGNESIUM HYDROXIDE SCH ML: 400 SUSPENSION ORAL at 08:31

## 2019-04-26 RX ADMIN — CEFAZOLIN SCH MLS/HR: 1 INJECTION, POWDER, FOR SOLUTION INTRAVENOUS at 03:37

## 2019-04-26 RX ADMIN — DOCUSATE SODIUM SCH MG: 100 CAPSULE, LIQUID FILLED ORAL at 08:30

## 2019-04-26 RX ADMIN — OXYCODONE HYDROCHLORIDE AND ACETAMINOPHEN PRN TAB: 5; 325 TABLET ORAL at 11:27

## 2019-04-26 RX ADMIN — ESCITALOPRAM OXALATE SCH MG: 10 TABLET ORAL at 08:30

## 2019-04-26 RX ADMIN — ACETAMINOPHEN SCH: 325 TABLET ORAL at 08:29

## 2019-04-26 RX ADMIN — ATORVASTATIN CALCIUM SCH MG: 10 TABLET, FILM COATED ORAL at 08:31

## 2019-04-26 RX ADMIN — APIXABAN SCH MG: 2.5 TABLET, FILM COATED ORAL at 08:30

## 2019-04-26 RX ADMIN — ONDANSETRON PRN MG: 2 INJECTION INTRAMUSCULAR; INTRAVENOUS at 06:10

## 2019-04-26 RX ADMIN — ACETAMINOPHEN SCH: 325 TABLET ORAL at 17:42

## 2019-04-26 RX ADMIN — DOCUSATE SODIUM SCH: 100 CAPSULE, LIQUID FILLED ORAL at 21:40

## 2019-04-26 RX ADMIN — MAGNESIUM HYDROXIDE SCH: 400 SUSPENSION ORAL at 21:41

## 2019-04-26 RX ADMIN — ONDANSETRON PRN MG: 4 TABLET, ORALLY DISINTEGRATING ORAL at 15:04

## 2019-04-26 RX ADMIN — TRAMADOL HYDROCHLORIDE PRN MG: 50 TABLET, FILM COATED ORAL at 21:37

## 2019-04-26 NOTE — PN
Progress Note





- Progress Note


Date of Service: 04/26/19


SOAP: 


Subjective:


[]Patient seen OOB in chair.  She is feeling nauseated and was dizzy upon 

ambulation earlier this morning.  Scopolomine patch has been ordered.  She 

denies CP, palpitations.  She complains of mild numbness anterior right tibia 

area. 








Objective:


[]


 Vital Signs











Temp  98.4 F   04/26/19 07:54


 


Pulse  69   04/26/19 07:54


 


Resp  16   04/26/19 11:27


 


BP  133/77   04/26/19 07:54


 


Pulse Ox  100   04/26/19 07:54








 Intake & Output











 04/25/19 04/26/19 04/26/19





 18:59 06:59 18:59


 


Intake Total 1800 2668 


 


Output Total  3000 


 


Balance 1800 -332 


 


Weight 148 lb  


 


Intake:   


 


  IV Fluids 1800 980 


 


    LR 1800 980 


 


  IVPB  110 


 


    ABX - CEFAZOLIN  110 


 


  Oral  1578 


 


Output:   


 


  Urine  1200 


 


  Dove  1800 


 


Other:   


 


  # Bowel Movements  0 








 Laboratory Results - last 24 hr











  04/26/19 04/26/19





  06:28 06:28


 


Hgb  9.9 L 


 


Hct  29 L 


 


Plt Count  172 


 


MPV  8.3 


 


Sodium   139


 


Potassium   4.2


 


Chloride   106


 


Carbon Dioxide   27


 


Anion Gap   6


 


BUN   9


 


Creatinine   0.79


 


Est GFR ( Amer)   88.7


 


Est GFR (Non-Af Amer)   73.3


 


BUN/Creatinine Ratio   11.4


 


Glucose   118 H


 


Calcium   9.0


 


Magnesium   2.2











Right knee dressing is dry and intact


There is no significant edema RLE, has diminished sensation anterior medial 

tibial region


calf NT and soft


Full DF right ankle








Assessment:


[]s/p Right total knee arthroplasty POD #1








Plan:


[]Pt unsure about going home today, Scope patch just started.


  Numbness may be due to post op swelling, reassured should resolve


  Eliquis for DVT prophylaxis


  Probable discharge home 4/27

## 2019-04-27 LAB
HCT VFR BLD AUTO: 26 % (ref 33–41)
HGB BLD-MCNC: 8.9 G/DL (ref 12–16)
PLATELET # BLD AUTO: 141 10^3/UL (ref 150–450)

## 2019-04-27 RX ADMIN — APIXABAN SCH MG: 2.5 TABLET, FILM COATED ORAL at 08:48

## 2019-04-27 RX ADMIN — MAGNESIUM HYDROXIDE SCH: 400 SUSPENSION ORAL at 20:40

## 2019-04-27 RX ADMIN — DOCUSATE SODIUM SCH: 100 CAPSULE, LIQUID FILLED ORAL at 20:39

## 2019-04-27 RX ADMIN — ONDANSETRON PRN MG: 2 INJECTION INTRAMUSCULAR; INTRAVENOUS at 07:23

## 2019-04-27 RX ADMIN — ACETAMINOPHEN SCH MG: 325 TABLET ORAL at 01:33

## 2019-04-27 RX ADMIN — ACETAMINOPHEN SCH MG: 325 TABLET ORAL at 16:18

## 2019-04-27 RX ADMIN — DOCUSATE SODIUM SCH MG: 100 CAPSULE, LIQUID FILLED ORAL at 08:47

## 2019-04-27 RX ADMIN — ONDANSETRON PRN MG: 2 INJECTION INTRAMUSCULAR; INTRAVENOUS at 14:00

## 2019-04-27 RX ADMIN — ATORVASTATIN CALCIUM SCH MG: 10 TABLET, FILM COATED ORAL at 08:48

## 2019-04-27 RX ADMIN — ACETAMINOPHEN SCH MG: 325 TABLET ORAL at 08:47

## 2019-04-27 RX ADMIN — MAGNESIUM HYDROXIDE SCH: 400 SUSPENSION ORAL at 08:46

## 2019-04-27 RX ADMIN — ESCITALOPRAM OXALATE SCH MG: 10 TABLET ORAL at 08:47

## 2019-04-27 RX ADMIN — ONDANSETRON PRN MG: 4 TABLET, ORALLY DISINTEGRATING ORAL at 01:45

## 2019-04-27 RX ADMIN — ATENOLOL SCH MG: 50 TABLET ORAL at 08:48

## 2019-04-27 RX ADMIN — APIXABAN SCH MG: 2.5 TABLET, FILM COATED ORAL at 20:36

## 2019-04-27 NOTE — PN
Progress Note





- Progress Note


Date of Service: 04/27/19


SOAP: 


Subjective:


[Pt reports some nausea still and slight dizziness upon rising.  Thinks 

scopolamine patch is helping Feels best lying down.  Denies CP, SOB. ]








Objective:


[A and O x 3, NAD


Resting in bed - appears comfortable.


R knee dressing changed - surgical incision benign


Calf soft, NT, gross motor and NV function intact





Vital Signs:











Temp Pulse Resp BP Pulse Ox


 


 98.3 F   65   16   130/80   93 


 


 04/27/19 07:43  04/27/19 07:43  04/27/19 08:00  04/27/19 07:43  04/27/19 08:00











 Laboratory Results - last 24 hr











  04/27/19





  06:22


 


Hgb  8.9 L


 


Hct  26 L


 


Plt Count  141 L


 


MPV  8.2








]








Assessment:


[s/p R TKA POD #2]








Plan:


[PT/OT - WBAT R LE


Pain and nausea management


Eliquis for DVT proph


Plan for D/C home tomorrow]

## 2019-04-27 NOTE — PN
Subjective


Date of Service: 04/27/19


Interval History: 








No acute events overnight


Pt with queasiness yesterday and nausea today. 


Noted to be hypoxic to 82% on RA. Slept at a slight incline as otherwise would 

hurt back (usually on side)


Weight up from 67kg to 74kg. CXR consistent with volume overload. 











Objective


Active Medications: 








Acetaminophen (Tylenol Tab*)  975 mg PO Q8H [facility]


   Last Admin: 04/27/19 16:18 Dose:  975 mg


Apixaban (Eliquis*)  2.5 mg PO BID [facility]


   Last Admin: 04/27/19 08:48 Dose:  2.5 mg


[facility]in [facility]*)  [facility]


   Last Admin: 04/27/19 08:48 Dose:  50 mg


Atorvastatin Calcium (Lipitor*)  5 mg PO [facility]


   Last Admin: 04/27/19 08:48 Dose:  5 mg


Bisacodyl (Dulcolax Supp*)  10 mg IN DAILY PRN


   PRN Reason: constipation


Cyclobenzaprine HCl (Flexeril Tab*)  10 mg PO TID PRN


   PRN Reason: SPASMS


Diphenhydramine HCl (Benadryl Iv*)  12.5 mg IV Q6H PRN


   PRN Reason: PRURITIS


Docusate Sodium (Colace Cap*)  100 mg PO BID [facility]


   Last Admin: 04/27/19 08:47 Dose:  100 mg


Escitalopram Oxalate (Lexapro *)  10 mg PO [facility]


   Last Admin: 04/27/19 08:47 Dose:  10 mg


Fenofibrate (Tricor)  54 mg PO [facility]


   Last Admin: 04/27/19 08:49 Dose:  54 mg


Lactulose (Lactulose*)  30 ml PO Q6H PRN


   PRN Reason: constipation


Magnesium Hydroxide (Milk Of Magnesia Liq*)  30 ml PO BID [facility]


   Last Admin: 04/27/19 08:46 Dose:  Not Given


Magnesium Hydroxide (Milk Of Magnesia Liq*)  30 ml PO Q6H PRN


   PRN Reason: constipation


Morphine Sulfate (Morphine Inj (Syringe))*)  2 mg IV Q2H PRN


   PRN Reason: PAIN


Ondansetron HCl (Zofran Inj*)  4 mg IV Q6H PRN


   PRN Reason: nausea


   Last Admin: 04/27/19 14:00 Dose:  4 mg


Ondansetron HCl (Zofran Odt Tab*)  4 mg PO Q6H PRN


   PRN Reason: NAUSEA


   Last Admin: 04/27/19 01:45 Dose:  4 mg


Oxycodone HCl (Roxycodone Tab*)  10 mg PO Q4H PRN


   PRN Reason: SEVERE PAIN


Oxycodone/Acetaminophen (Percocet 5/325 Tab*)  1 tab PO Q4H PRN


   PRN Reason: PAIN


   Last Admin: 04/26/19 06:09 Dose:  1 tab


Oxycodone/Acetaminophen (Percocet 5/325 Tab*)  2 tab PO Q4H PRN


   PRN Reason: PAIN


   Last Admin: 04/26/19 11:27 Dose:  2 tab


Polyethylene Glycol/Electrolytes (Miralax*)  17 gm PO DAILY PRN


   PRN Reason: Constipation


Scopolamine (Transderm-Scop 1.5 Mg Patch*)  1 patch TRANSDERM Q72H [facility]


   Last Admin: 04/26/19 09:57 Dose:  1 patch


Tramadol HCl (Ultram*)  50 mg PO Q6H PRN


   PRN Reason: PAIN


   Last Admin: 04/26/19 21:37 Dose:  50 mg








 Vital Signs - 8 hr











  04/27/19 04/27/19 04/27/19





  16:34 16:59 17:08


 


Temperature 98.6 F  


 


Pulse Rate 71  67


 


Respiratory 18  





Rate   


 


Blood Pressure 169/91 200/110 180/104





(mmHg)   


 


O2 Sat by Pulse 83 90 90





Oximetry   














  04/27/19 04/27/19





  17:21 17:52


 


Temperature  


 


Pulse Rate  65


 


Respiratory  





Rate  


 


Blood Pressure  168/100





(mmHg)  


 


O2 Sat by Pulse 94 93





Oximetry  











Oxygen Devices in Use Now: None


Appearance: NAD


Eyes: No Scleral Icterus


Ears/Nose/Mouth/Throat: NL Teeth, Lips, Gums


Neck: NL Appearance and Movements; NL JVP


Respiratory: - - rales and bilateral bases. no rhonchi or wheezing. 


Cardiovascular: NL Sounds; No Murmurs; No JVD, RRR


Abdominal: - - soft, nontender, slightly distended. 


Extremities: - - 1+ edema b/l LE


Skin: No Rash or Ulcers


Neurological: Alert and Oriented x 3


Nutrition: Taking PO's


Result Diagrams: 


 04/27/19 06:22





 04/26/19 06:28


Additional Lab and Data: 





 Laboratory Results - last 24 hr











  04/27/19





  06:22


 


Hgb  8.9 L


 


Hct  26 L


 


Plt Count  141 L


 


MPV  8.2














Assess/Plan/Problems-Billing


Assessment: 





64 yo female PMH HTN, HLD, WPW pattern on EKG, right knee OA s/p RTK. POD #2, 

course complicated by nausea and now hypoxia. CXR with mild pulm vascular 

congestion and small right plerural effusion and exam consistent with volume 

overload. 





- Patient Problems


(1) Osteoarthritis


Current Visit: Yes   Status: Acute   Code(s): M19.90 - UNSPECIFIED 

OSTEOARTHRITIS, UNSPECIFIED SITE   SNOMED Code(s): 644816264


   Comment: now POD #2 with Dr. Brody RTK


pain management per Ortho. bowel regimen. 


eliquis for dvt ppx   





(2) Hypoxia


Current Visit: Yes   Status: Acute   Code(s): R09.02 - HYPOXEMIA   SNOMED Code(s

): 997966497


   Comment: suspect volume overload given rales and CXR findings of mild 

pulmonary edema and small pleural effusion on right. Lasix 40mg IV once now and 

40mg po in AM. Adding on BNP. If elevated then would pursue ECHO which she has 

never had. No coughing or fevers. Weight up significantly. 7kg though also had 

ice pack on today.    





(3) HTN (hypertension)


Current Visit: Yes   Status: Acute   Code(s): I10 - ESSENTIAL (PRIMARY) 

HYPERTENSION   SNOMED Code(s): 74239829


   Comment: continue atenolol. 


had been 140-150s but with nausea has been spiking up today to 180s. Adding 

lasix for suspected volume overload. 


add lisinopril 2.5mg once.   





(4) WPW (Pili-Parkinson-White syndrome)


Current Visit: Yes   Status: Acute   Code(s): I45.6 - PRE-EXCITATION SYNDROME   

SNOMED Code(s): 56702560


   Comment: iv procainmide or CV if RVR   





(5) HLD (hyperlipidemia)


Current Visit: Yes   Status: Acute   Code(s): E78.5 - HYPERLIPIDEMIA, 

UNSPECIFIED   SNOMED Code(s): 34791328


   Comment: continue atorvastatin.    


Status and Disposition: 





medicine consulting. ortho primary.

## 2019-04-28 VITALS — DIASTOLIC BLOOD PRESSURE: 78 MMHG | SYSTOLIC BLOOD PRESSURE: 124 MMHG

## 2019-04-28 LAB
ANION GAP SERPL CALC-SCNC: 4 MMOL/L (ref 2–11)
BUN SERPL-MCNC: 7 MG/DL (ref 6–24)
BUN/CREAT SERPL: 8.6 (ref 8–20)
CALCIUM SERPL-MCNC: 8.9 MG/DL (ref 8.6–10.3)
CHLORIDE SERPL-SCNC: 96 MMOL/L (ref 101–111)
GLUCOSE SERPL-MCNC: 121 MG/DL (ref 70–100)
HCO3 SERPL-SCNC: 30 MMOL/L (ref 22–32)
HCT VFR BLD AUTO: 27 % (ref 33–41)
HGB BLD-MCNC: 9.3 G/DL (ref 12–16)
PLATELET # BLD AUTO: 177 10^3/UL (ref 150–450)
POTASSIUM SERPL-SCNC: 4 MMOL/L (ref 3.5–5)
SODIUM SERPL-SCNC: 130 MMOL/L (ref 135–145)

## 2019-04-28 RX ADMIN — ESCITALOPRAM OXALATE SCH MG: 10 TABLET ORAL at 08:27

## 2019-04-28 RX ADMIN — ATENOLOL SCH MG: 50 TABLET ORAL at 08:27

## 2019-04-28 RX ADMIN — MAGNESIUM HYDROXIDE SCH: 400 SUSPENSION ORAL at 08:30

## 2019-04-28 RX ADMIN — APIXABAN SCH MG: 2.5 TABLET, FILM COATED ORAL at 08:26

## 2019-04-28 RX ADMIN — ACETAMINOPHEN SCH MG: 325 TABLET ORAL at 08:27

## 2019-04-28 RX ADMIN — ACETAMINOPHEN SCH MG: 325 TABLET ORAL at 01:19

## 2019-04-28 RX ADMIN — DOCUSATE SODIUM SCH MG: 100 CAPSULE, LIQUID FILLED ORAL at 08:28

## 2019-04-28 RX ADMIN — ATORVASTATIN CALCIUM SCH MG: 10 TABLET, FILM COATED ORAL at 08:28

## 2019-04-28 NOTE — PN
Progress Note





- Progress Note


Date of Service: 04/28/19


SOAP: 


Subjective:


[]








Objective:


[]








Assessment:


[]








Plan:


[]

## 2019-04-29 NOTE — DS
DISCHARGE SUMMARY:

 

DATE OF ADMISSION:  04/25/19

 

DATE OF DISCHARGE:  04/28/19

 

PROVIDER:  Ava Brody MD

 

ADMITTING PHYSICIAN:  Dr. Brody.* (DICTATED BY AMAURY CHAVEZ)

 

ADMITTING DIAGNOSES:

1.  Right knee osteoarthritis.

2.  Hypertension.

 

DISCHARGE DIAGNOSES:

1.  Status post right total knee arthroplasty.

2.  Hypertension.

 

PROCEDURE:  Right total knee arthroplasty.

 

CONSULTANTS:  Physical Therapy, Occupational Therapy, Medicine.

 

BRIEF HISTORY:  Ms. Shepherd is a 63-year-old female with severe degenerative 
osteoarthritis of her right knee.  She failed conservative treatment measures 
and elected to undergo a right total knee arthroplasty on 04/25/19 with Dr. Brody.

 

HOSPITAL COURSE: Ms. Shepherd was admitted to [facility] on 04/25/19. 
She underwent an uncomplicated right total knee arthroplasty.  Postoperatively, 
she recovered on a short-stay surgical unit.  Her Dove catheter was removed on 
postoperative day #1 and she was able to urinate on her own.  During her course 
of stay in the hospital, she experienced nausea, which improved with placement 
of a scopolamine patch and use of Zofran.  She was able to advance her diet 
during the 3 days of her hospital stay. Her pain was well controlled with 
Percocet, and she used Eliquis for DVT prophylaxis.  She was restarted on her 
home medications. Vital signs and labs remained stable.  She was able to bear 
weight as tolerated on the right lower extremity.  She advanced appropriately 
with Physical Therapy and Occupational Therapy.  By postoperative day #3, she 
was orthopedically and medically stable for discharge home with services.

 

PHYSICAL EXAM:  General:  On examination, the patient is noted to be calm and 
cooperative in no acute distress.  She is alert and oriented x3.  Vital Signs:  
On the day of discharge, temperature 98.8 degrees Fahrenheit, respiratory rate 
16, pulse rate 67, O2 sat 94% on room air.  Extremities:  Examination of her 
right lower extremity demonstrates a dressing overlying the right knee which is 
clean, dry, and intact.  Her calf is soft and nontender.  Distally, she is 
neurovascularly intact.  Gross motor is also intact.

 

RADIOGRAPHS:  Postoperative radiographs of the right knee demonstrate a right 
total knee arthroplasty with satisfactory prosthesis placement and no acute 
bony abnormalities.

 

MEDICATIONS ON DISCHARGE:

1.  Escitalopram 10 mg a day.

2.  Atenolol 50 mg b.i.d.

3.  Fenofibrate 54 mg daily.

4.  Simvastatin 10 mg q.h.s.

5.  Vitamin B12 monthly injection.

6.  Tylenol as needed.

7.  Percocet 5/325 mg 1 to 2 tabs p.o. q.4 to 6 hours p.r.n. pain.

8.  Eliquis 2.5 mg b.i.d.

9.  Zofran 4 mg q.6 hours p.r.n. nausea.

 

CONDITION ON DISCHARGE:  Stable.

 

DISCHARGE INSTRUCTIONS:  Ms. Shepherd is a 63-year-old female, postoperative day #
3 status post right total knee arthroplasty which was uncomplicated.  She is 
orthopedically and medically stable to be discharged home with services.  She 
has stable vital signs and labs.  She has restarted home medications.  She will 
use Eliquis 2.5 mg b.i.d. for DVT prophylaxis and Percocet for pain management.
  She will remain weightbearing as tolerated with right lower extremity and 
have home physical therapy twice a day.  She will follow up in the office with 
Dr. Brody 10 to 14 days for incision check and suture removal.  She was 
instructed to call Dr. Brody immediately to the ER should she develop any new 
fevers, chills, incision pain, redness, or drainage.  She was instructed to go 
immediately to the ER should she develop chest pain or shortness of breath.

 

____________________________________ AMAURY CHAVEZ

 

[id number]/[facility] #: [id number]

[facility]

## 2019-04-29 NOTE — DS
CC:  Dr. Brody; Archana Mendez NP*

 

DISCHARGE SUMMARY:

 

DATE OF ADMISSION:  04/25/19

 

DATE OF DISCHARGE:  04/28/19

 

PRIMARY DIAGNOSIS:  Right knee total arthroplasty.

 

SECONDARY DIAGNOSES:

1.  Anemia due to acute blood loss.

2.  Possible congestive heart failure.

3.  Hypertension.

4.  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

5.  Anxiety.

6.  Hyperlipidemia.

7.  B12 deficiency.

8.  Pili-Parkinson-White syndrome.



PROCEDURES:

1. RIGHT total knee arthroplasty by Dr. Brody on 4/25/19

 

MEDICATIONS ON DISCHARGE:

1.  Atenolol 50 mg p.o. q.a.m.

2.  Cyanocobalamin 1000 mcg IM q. month.

3.  Lexapro 10 mg p.o. q.a.m.

4.  Fenofibrate 54 mg p.o. q.a.m.

5.  Simvastatin 10 mg p.o. q.a.m.

6.  Eliquis 2.5 mg p.o. b.i.d.

 

Patient also has Percocet and Zofran prescribed preop from Orthopedics that she 
can use p.r.n.

 

HOSPITAL COURSE:  Patient was admitted for elective right total knee 
replacement. Prior to admission, she had cardiac consultation with Dr. Heredia 
regarding a history of  Pili-Parkinson-White/preexcitation syndrome.  The 
patient did not have any recurrence of arrhythmias during the hospital stay.  
She did have some postop pain in the right knee that led her to use opioid pain 
relief.  This seemed to lead to episodes of nausea.  Dr. Hernandez of Medicine was 
consulted regarding postop care. He addressed the patient's blood pressure and 
nausea.  On 04/27/19, the patient had hypoxia on room air and some orthopnea.  
The patient had a chest x-ray that demonstrated some vascular congestion and 
small right pleural effusion.  Her BNP was 411.  She was thought to have an 
episode of postop volume overload versus heart failure.  She was given a 
diuretic and she had a good diuresis.  On the day of discharge, she had 
saturations of 96% on room air.  It is recommended the patient have an 
outpatient echocardiogram to assess for systolic or diastolic heart failure.

 

Other lab tests on admission, the patient's admission hemoglobin was 9.9.  This 
fell to 8.9 postop and marcelle to 9.3 on discharge.  She also had a sodium of 139 
on admission and a sodium of 130 on 04/28/19.  The mild hyponatremia was not 
directly treated.

 

DISPOSITION:  To home, where she can have physical therapy for her knee.  The 
knee pain was rated as mild to moderate on discharge and she felt she may not 
need any more pain medicine.  The pathology on the explanted native knee is 
pending on discharge.  She should see Dr. Brody for followup within 1 week and 
see her primary care office also within 2 weeks.  We recommend a repeat CBC at 
the primary care office due to her postop anemia along with iron studies.



STATUS:  inpatient.  Her condition on discharge was stable.

 

DIET:  Low salt, low fat.

 

ACTIVITY:  To ambulate as tolerated with physical therapy.

 

TIME SPENT:  I spent more than 40 minutes arranging care for the patient, 
coordinating with Orthopedics, completing necessary documentation on the day of 
discharge.

 

 [id number]/[facility] #: [id number]

MTDD

## 2020-01-23 ENCOUNTER — HOSPITAL ENCOUNTER (INPATIENT)
Dept: HOSPITAL 25 - AA | Age: 65
LOS: 41 days | Discharge: HOME | DRG: 302 | End: 2020-03-04
Attending: ORTHOPAEDIC SURGERY | Admitting: ORTHOPAEDIC SURGERY
Payer: COMMERCIAL

## 2020-01-23 DIAGNOSIS — M25.762: ICD-10-CM

## 2020-01-23 DIAGNOSIS — Z85.828: ICD-10-CM

## 2020-01-23 DIAGNOSIS — Z88.5: ICD-10-CM

## 2020-01-23 DIAGNOSIS — Z91.040: ICD-10-CM

## 2020-01-23 DIAGNOSIS — M21.052: ICD-10-CM

## 2020-01-23 DIAGNOSIS — Z79.899: ICD-10-CM

## 2020-01-23 DIAGNOSIS — E78.00: ICD-10-CM

## 2020-01-23 DIAGNOSIS — F32.9: ICD-10-CM

## 2020-01-23 DIAGNOSIS — M17.12: Primary | ICD-10-CM

## 2020-01-23 DIAGNOSIS — Z91.048: ICD-10-CM

## 2020-01-23 DIAGNOSIS — E78.2: ICD-10-CM

## 2020-01-23 DIAGNOSIS — I10: ICD-10-CM

## 2020-01-23 DIAGNOSIS — G43.909: ICD-10-CM

## 2020-01-23 DIAGNOSIS — Z96.651: ICD-10-CM

## 2020-01-23 DIAGNOSIS — Z87.891: ICD-10-CM

## 2020-01-23 PROCEDURE — 36415 COLL VENOUS BLD VENIPUNCTURE: CPT

## 2020-01-23 PROCEDURE — C1776 JOINT DEVICE (IMPLANTABLE): HCPCS

## 2020-01-23 PROCEDURE — 80048 BASIC METABOLIC PNL TOTAL CA: CPT

## 2020-01-23 PROCEDURE — 85018 HEMOGLOBIN: CPT

## 2020-01-23 PROCEDURE — 85014 HEMATOCRIT: CPT

## 2020-01-23 PROCEDURE — 88311 DECALCIFY TISSUE: CPT

## 2020-01-23 PROCEDURE — 85049 AUTOMATED PLATELET COUNT: CPT

## 2020-01-23 PROCEDURE — 88305 TISSUE EXAM BY PATHOLOGIST: CPT

## 2020-02-21 NOTE — HP
HISTORY AND PHYSICAL:

 

DATE OF ADMISSION/SURGERY:  20

 

DATE OF OFFICE VISIT:  20

 

SURGEON:  Ava Brody MD * (DICTATED BY AMAURY PABLO)

 

PROCEDURE:  Left total knee arthroplasty.

 

CHIEF COMPLAINT:  Left knee pain.

 

HISTORY OF PRESENT ILLNESS:  Ms. Shepherd is a 64-year-old female with end-stage 
osteoarthritis of the left knee.  She has failed conservative treatment and 
elected to proceed with a left total knee arthroplasty.

 

PAST MEDICAL HISTORY:  Hypertension, high cholesterol, and squamous cell 
carcinoma of the skin.

 

PAST SURGICAL HISTORY:  Right total knee arthroplasty, bunionectomy of the left 
foot, trigger thumb release, and a .

 

CURRENT MEDICATIONS:

1.  Cyclobenzaprine as needed.

2.  Fenofibrate 54 mg daily.

3.  Simvastatin 10 mg q.h.s.

4.  Atenolol 50 mg twice a day.

5.  Cyanocobalamin injections intramuscularly monthly.

6.  Tylenol as needed.

7.  Escitalopram 10 mg a day.

 

ALLERGIES:  No known drug allergies.

 

FAMILY HISTORY:  Cancer and coronary artery disease.

 

SOCIAL HISTORY:  She is a 64-year-old female.  She lives with her .  She 
does not smoke or use drugs.  She does drink nightly alcohol.

 

REVIEW OF SYSTEMS:  A complete 14-point review of systems was reviewed with the 
patient.  It was positive for some occasional shortness of breath.  She denies 
history of DVT, PE, hepatitis, HIV, or anesthesia problems.

 

                               PHYSICAL EXAMINATION

 

GENERAL:  She is well developed, well nourished, in no acute distress.

 

VITAL SIGNS:  She stands 62-1/2 inches tall, weighs 50 pounds.  Her blood 
pressure is 136/80, her heart rate is 86.

 

HEENT:  Normocephalic, atraumatic.

 

NECK:  Supple.  No palpable lymph nodes.

 

PULMONARY:  The lungs are clear to auscultation bilaterally.

 

CARDIO:  Regular rate and rhythm.  Strong S1, S2.

 

ABDOMEN:  Soft, nontender, nondistended.

 

NEUROLOGICAL:  She is alert and oriented x3.

 

MUSCULOSKELETAL:  Left lower extremity:  The skin is intact.  There are no open 
wounds or abrasions.  There is a moderate effusion of the left knee joint, some 
tenderness along the medial joint line.  There is a 10-degree valgus deformity 
of the left knee.  Range of motion is 5 to 120 degrees of flexion with 
patellofemoral crepitus.  She is able to dorsiflex and plantarflex, has a 2+ 
dorsalis pedis pulse and intact sensation.

 

 ASSESSMENT AND PLAN:  Ms. Shepherd is a 64-year-old female with end-stage 
osteoarthritis of the left knee.  She has failed conservative treatment and 
elected to proceed with a left total knee arthroplasty.  The surgery is 
scheduled for 

20 with Dr. Brody.   discussed the risks and benefits of the 
surgery at today's visit and all of her questions were answered.  She will 
follow up with Dr. Brody 2 weeks after the surgery.

 

 ____________________________________ AMAURY PABLO

 

[id number]/[facility] #: [id number]

MTDD

## 2020-03-03 PROCEDURE — 0SRD069 REPLACEMENT OF LEFT KNEE JOINT WITH OXIDIZED ZIRCONIUM ON POLYETHYLENE SYNTHETIC SUBSTITUTE, CEMENTED, OPEN APPROACH: ICD-10-PCS | Performed by: ORTHOPAEDIC SURGERY

## 2020-03-03 RX ADMIN — TRAMADOL HYDROCHLORIDE PRN MG: 50 TABLET, FILM COATED ORAL at 22:05

## 2020-03-03 RX ADMIN — MAGNESIUM HYDROXIDE SCH: 400 SUSPENSION ORAL at 19:36

## 2020-03-03 RX ADMIN — TRAMADOL HYDROCHLORIDE PRN MG: 50 TABLET, FILM COATED ORAL at 16:00

## 2020-03-03 RX ADMIN — SODIUM CHLORIDE, SODIUM LACTATE, POTASSIUM CHLORIDE, AND CALCIUM CHLORIDE SCH MLS/HR: 600; 310; 30; 20 INJECTION, SOLUTION INTRAVENOUS at 16:05

## 2020-03-03 RX ADMIN — DOCUSATE SODIUM SCH MG: 100 CAPSULE, LIQUID FILLED ORAL at 19:36

## 2020-03-03 RX ADMIN — ACETAMINOPHEN PRN MG: 325 TABLET ORAL at 18:44

## 2020-03-03 RX ADMIN — CEFAZOLIN SCH MLS/HR: 1 INJECTION, POWDER, FOR SOLUTION INTRAVENOUS at 19:36

## 2020-03-03 NOTE — XMS REPORT
Continuity of Care Document (CCD)

 Created on:2020



Patient:Terra Shepherd

Sex:Female

:1955

External Reference #:[id number]





Demographics







 Address  [address] [place] [address]

 

 Home Phone  [phone]

 

 Mobile Phone  [phone]

 

 Work Phone  [phone]

 

 Email Address  [email or web address]

 

 Preferred Language  en

 

 Marital Status  Not  or 

 

 Yarsani Affiliation  Unknown

 

 Race  White

 

 Ethnic Group  Not  or 









Author







 Name  Lorrie Rodríguez M.D., FACP (transmitted by agent of provider Sarina Adkins)

 

 Address  [address], [address]



   [place] [address]









Care Team Providers







 Name  Role  Phone

 

 Wendy Parekh MD - Internal  Care Team Information   [phone]
[phone]



 Medicine    









Problems







 Active Problems  Provider  Date

 

 Mixed hyperlipidemia  Lorrie Rodríguez M.D., FACP  Onset: 2011

 

 Benign essential hypertension  Laith Hart M.D.  Onset: 2011

 

 Localized, primary osteoarthritis of the  Mo Fountain MD  Onset: 2017



 hand    

 

 Localized, primary osteoarthritis  Ava Brody M.D.  Onset: 10/26/2018







Social History







 Type  Date  Description  Comments

 

 Birth Sex    Unknown  

 

 Tobacco Use  Start: Unknown End:  Former Cigarette Smoker  Quit ~ 



   Unknown  1 Pack Daily  

 

 Smoking Status  Reviewed: 20  Former Cigarette Smoker  Quit ~ 



     1 Pack Daily  

 

 ETOH Use    Currently consumes  2 glasses of wine



     alcohol  daily

 

 Tobacco Use  Start: Unknown End:  Patient is a former  quit at age 29



   Unknown  smoker  

 

 Recreational Drug Use    Denies Drug Use  

 

 Exercise Type/Frequency    Exercises regularly  







Allergies, Adverse Reactions, Alerts







 Description

 

 No Known Drug Allergies







Medications







 Active Medications  SIG  Qnty  Indications  Ordering  Date



         Provider  

 

 Amoxicillin  4 tabs 1 hour prior  4tabs    Fran Caldera,  2019



          500mg Tablets  to dental      MD  



   procedures        

 

 Cyclobenzaprine HCL  take 1 tab by mouth  90tabs  Z47.1  Ava Brody,  2019



                  10mg  2-3 times a day as      M.D.  



 Tablets  needed        



           

 

 BD  use as directed for  6units    Archana Mendez,  2018



 3ml  vitamin b 12      N.P.  



   injection once a        



   month        

 

 Fenofibrate  take 1 tablet by  90tabs    Archana Velazquezmaikol,  2011



          54mg Tablets  mouth daily      N.P.  



           

 

 Syringes  use as directed for  30units    Archana Velazquezmaikol,  2010



       25Gauge 1 Inch  vitamin b12      N.P.  



   injections once        



   monthly        

 

 Simvastatin  take 1 tablet at  90tabs    Archana Mendez,  



          10mg Tablets  bedtime      N.P.  



           

 

 Atenolol  Take 1 Tablet By  90tabs    Archana Andrea,  



       50mg Tablets  Mouth Twice A Day      N.P.  



           

 

 Cyanocobalamin  inject 1  1units    Archana Velazquezmaikol,  



             1000mcg/ML  milliliters      N.P.  



 Solution  intramuscular once        



   a month        

 

 Tylenol        Unknown  

 

 Escitalopram Oxalate  Take 1 Tablet By  30tabs    Archana Varn,  



                   10mg  Mouth Every Day      N.P.  



 Tablets          



           







Medications Administered in Office







 Medication  SIG  Qnty  Indications  Ordering Provider  Date

 

 Synvisc Or Synvisc-One Injection 1        Ava Brody M.D.  2019



 MG                      Injection          



           

 

 Synvisc Or Synvisc-One Injection 1        Ava Brody M.D.  2018



 MG                      Injection          



           

 

 Synvisc Or Synvisc-One Injection 1        Ava Brody M.D.  2018



 MG                      Injection          



           

 

 Synvisc Or Synvisc-One Injection Ale Brody M.D.  12/10/2018



 MG                      Injection          



           

 

 Synvisc Or Synvisc-One Injection Ale Brody M.D.  12/10/2018



 MG                      Injection          



           

 

 Synvisc Or Synvisc-One Injection 1        Ava Brody M.D.  2018



 MG                      Injection          



           

 

 Depomedrol 40MG        Ava Brody M.D.  2018



   Injection          

 

 Depomedrol 40MG        Ava Brody M.D.  10/26/2018



   Injection          

 

 B-12 Injection        Nurse Visit A  2014



  Injection          







Immunizations







 CPT Code  Status  Date  Vaccine  Reaction  Lot #

 

 [id number]  Given  2020  Influenza Virus Vaccine,    



       Quadrivalent, Split,    



       Preservative Free    

 

 65345  Given  2020  Influenza Virus Vaccine,    [id number]



       Quadrivalent, Split,    



       Preservative Free    

 

 67844  Given  2019  Tetanus And Diptheria (Td)  No immediate  [id number]



       For Adult Use Preservative  reaction..jh  



       Free    

 

 26555  Given  2019  Pneumococcal Conjugate  No immediate  [id number]



       Vaccine 13 Valent For  reaction...  



       Intramuscular Use    

 

 86226  Given  2018  Influenza Virus Vaccine,    



       Quadrivalent, Split,    



       Preservative Free    

 

   Given  2016  Flu Vaccine NOS    

 

 94001  Given  10/10/2015  Influenza Virus Vaccine,    



       Quadrivalent, Split,    



       Preservative Free    

 

   Given  2014  Fluvirin Im 3Yrs And Older    

 

   Given  2013  Fluvirin Im 3Yrs And Older    

 

   Given  2012  Afluria Vaccine    

 

 35739  Given  2011  Influenza Virus 3Yrs &    



       Over    

 

 38960  Given  2011  Influenza Virus 3Yrs &    



       Over    

 

 66606  Given  2011  Influenza Virus 3Yrs &    [id number]



       Over    

 

 83002  Given  10/21/2010  Influenza Virus 3Yrs &    



       Over    

 

 96573  Given  10/28/2009  Influenza Virus 3Yrs &    



       Over    

 

 70341  Given  2008  Tdap -    



       Tetanus/Diptheria/Acellula    



       r Pertussis    







Vital Signs







 Date  Vital  Result  Comment

 

 2020  2:28pm  Height  62.5 inches  5'2.50"









 Weight  156.00 lb  

 

 Heart Rate  65 /min  

 

 BP Systolic Sitting  179 mmHg  Rue reg cuff

 

 BP Diastolic Sitting  99 mmHg  Rue reg cuff

 

 Body Temperature  99.3 F  

 

 O2 % BldC Oximetry  99 %  

 

 BMI (Body Mass Index)  28.1 kg/m2  









 2019  1:04pm  Height  62.5 inches  5'2.50"









 Weight  145.00 lb  

 

 BP Systolic  136 mmHg  

 

 BP Diastolic  82 mmHg  

 

 Respiratory Rate  16 /min  

 

 Pain Level  3  

 

 BMI (Body Mass Index)  26.1 kg/m2  







Results







 Test  Acquired Date  Facility  Test  Result  H/L  Range  Note

 

 Lipid Profile  2019  Health system  Triglycerides  261 mg/dL    
  1



 (Trig/Chol/HDL)    [address]          



     [place] [address] [phone]          









 Cholesterol  228 mg/dL      2

 

 HDL Cholesterol  82.8 mg/dL      3

 

 LDL Cholesterol  93 mg/dL      4









 Comp Metabolic  2019  Health system  Sodium  140 mmol/L  Normal  
135-145  



 Panel    [address] [place] [address] [phone]          









 Potassium  4.1 mmol/L  Normal  3.5-5.0  

 

 Chloride  105 mmol/L  Normal  101-111  

 

 Co2 Carbon Dioxide  30 mmol/L  Normal  22-32  

 

 Anion Gap  5 mmol/L  Normal  2-11  

 

 Glucose  123 mg/dL  High    

 

 Blood Urea Nitrogen  14 mg/dL  Normal  6-24  

 

 Creatinine  0.77 mg/dL  Normal  0.51-0.95  

 

 BUN/Creatinine Ratio  18.2  Normal  8-20  

 

 Calcium  9.6 mg/dL  Normal  8.6-10.3  

 

 Total Protein  6.6 g/dL  Normal  6.4-8.9  

 

 Albumin  4.4 g/dL  Normal  3.2-5.2  

 

 Globulin  2.2 g/dL  Normal  2-4  

 

 Albumin/Globulin Ratio  2.0  Normal  1-3  

 

 Total Bilirubin  0.90 mg/dL  Normal  0.2-1.0  

 

 Alkaline Phosphatase  64 U/L  Normal    

 

 Alt  17 U/L  Normal  7-52  

 

 Ast  17 U/L  Normal  13-39  

 

 Egfr Non-  75.2    >60  

 

 Egfr   91.0    >60  5









 Laboratory test  2019  Health system  Vitamin B12  356 pg/mL  
Normal  180-914  6



 finding    [address] [place] [address] [phone]          









 1  Desirable: <150



   Borderline High: 150-199



   High: 200-499



   Very High: >500

 

 2  Desirable: <200



   Borderline High: 200-239



   High: >239

 

 3  Low: <40



   Desirable: 40-60



   High: >60

 

 4  Desirable: <100



   Near Optimal: 100-129



   Borderline High: 130-159



   High: 160-189



   Very High: >189

 

 5  *******Because ethnic data is not always readily available,



   this report includes an eGFR for both -Americans and



   non- Americans.****



   The National Kidney Disease Education Program (NKDEP) does



   not endorse the use of the MDRD equation for patients that



   are not between the ages of 18 and 70, are pregnant, have



   extremes of body size, muscle mass, or nutritional status,



   or are non- or non-.



   According to the National Kidney Foundation, irrespective of



   diagnosis, the stage of the disease is based on the level of



   kidney function:



   Stage Description                      GFR(mL/min/1.73 m(2))



   1     Kidney damage with normal or decreased GFR       90



   2     Kidney damage with mild decrease in GFR          60-89



   3     Moderate decrease in GFR                         30-59



   4     Severe decrease in GFR                           15-29



   5     Kidney failure                       <15 (or dialysis)

 

 6  Normal Range 180 to 914



   Indeterminate Range 145 to 180



   Deficient Range  <145







Procedures







 Date  Code  Description  Status

 

 2020  12291  EKG Tracing & Interpretation  Completed

 

 10/07/2019  30897022  Mammogram  Completed

 

 10/04/2018  02555629  Mammogram  Completed

 

 2018  77466952  Colonoscopy  Completed

 

 2017  77042250  Mammogram  Completed

 

 2016  [id number]  Mammogram  Completed

 

 01/15/2015  [id number]  Mammogram  Completed

 

 2014  00403899  Mammogram  Completed

 

 2013  36662009  Colonoscopy  Completed

 

 2012  04561374  Mammogram  Completed

 

 10/11/2011  557790391  Bone Mineral Density Test  Completed

 

 10/11/2011  [id number]  Mammogram  Completed

 

 2010  41061568  Mammogram  Completed

 

 09/10/2007  79297067  Colonoscopy  Completed

 

 2007  732970350  Bone Mineral Density Test  Completed







Medical Devices







 Description

 

 No Information Available







Encounters







 Type  Date  Location  Provider  Dx  Diagnosis

 

 Office Visit  2019  [facility]  Ava Brody,  M25.562  Pain in 
left knee



   1:00p  at [facility]  M.D.    









 M25.462  Effusion, left knee

 

 M17.12  Unilateral primary osteoarthritis, left knee









 Office Visit  2019  9:20a  [facility]  Archana Mendez,  Z00.00  Encntr 
for



     Medicine - [facility]  N.P.    general adult



           medical exam



           w/o abnormal



           findings









 Z12.31  Encntr screen mammogram for malignant neoplasm of breast

 

 I10  Essential (primary) hypertension

 

 D51.9  Vitamin B12 deficiency anemia, unspecified

 

 E78.00  Pure hypercholesterolemia, unspecified

 

 N95.8  Other specified menopausal and perimenopausal disorders

 

 Z23  Encounter for immunization







Assessments







 Date  Code  Description  Provider

 

 2020  Z01.818  Encounter for other preprocedural  Lorrie Rodríguez M.D., FACP



     examination  

 

 2020  I10  Essential (primary) hypertension  Lorrie Rodríguez M.D., FACP

 

 2020  M17.12  Unilateral primary osteoarthritis, left  Lorrie Anne, M.D.
, [facility]



     knee  

 

 2020  Z23  Encounter for immunization  Lorrie Rodríguez M.D., [facility]

 

 2019  M25.562  Pain in left knee  Ava Brody M.D.

 

 2019  M25.462  Effusion, left knee  Ava Brody M.D.

 

 2019  M17.12  Unilateral primary osteoarthritis, left  Ava Brody M.D.



     knee  

 

 2019  Z00.00  Encounter for general adult medical  Archana Varn, N.P.



     examination without abnormal findings  

 

 2019  Z12.31  Encounter for screening mammogram for  Archana Varn, N.P.



     malignant neoplasm of breast  

 

 2019  I10  Essential (primary) hypertension  Archana Varn, N.P.

 

 2019  D51.9  Vitamin B12 deficiency anemia, unspecified  Archana Varn, 
N.P.

 

 2019  E78.00  Pure hypercholesterolemia, unspecified  Archana Varn, N.P.

 

 2019  N95.8  Other specified menopausal and  Archana Varn, N.P.



     perimenopausal disorders  

 

 2019  Z23  Encounter for immunization  Archana Varn, N.P.







Plan of Treatment

Future Appointment(s):2020  4:00 pm - Lorrie Rodríguez M.D., FACP at [facility] 
Internal Medicine - [facility]2020  2:00 pm - Ava Brody M.D. at [facility] at [id number]  4:30 pm - Ava Brody M.D. at [facility] at [id number] - Lorrie Rodríguez M.D., FACPZ01.818 Encounter for 
other preprocedural examinationComments:MEDICAL CLEARANCE PRIOR TO SURGERY:I 
will contact your surgeon with the results of today's visit.Unless you are told 
otherwise, you should take all your usual oral medications on the day of your 
intended procedure with water.  I have a low threshold to check an exercise 
stress test so to have an functional understanding of your heart.  I will 
discuss this with Dr. Heredia.I recommend that you avoid aspirin or aspirin 
containing products 7-10 days prior to surgery.Follow up:As needed.I10 
Essential (primary) hypertensionComments:HYPERTENSION:The current paradigm in 
medicine is "the lower, the better".  I think that it would be a good idea to 
check your bp's periodically over ther course of a day to see where you 
generally run.It is very important for you to take your meds daily. Alcohol 
will also increase your bp.  Try to cutback.M17.12 Unilateral primary 
osteoarthritis, left kneeComments:LEFT KNEE PAIN/ osteoarthritis:For TKRZ23 
Encounter for hnhucukzgrabH60.818 Encounter for other preprocedural 
examinationComments:MEDICAL CLEARANCE PRIOR TO SURGERY:I will contact your 
surgeon with the results of today's visit.Unless you are told otherwise, you 
should take all your usual oral medications on the day of your intended 
procedure with water.  I have a low threshold to check an exercise stress test 
so to have an functional understanding of your heart.  I will discuss this with 
Dr. Heredia.I recommend that you avoid aspirin or aspirin containing products 7-
10 days prior to surgery.Follow up:As needed.I10 Essential (primary) 
hypertensionComments:HYPERTENSION:The current paradigm in medicine is "the lower
, the better".  I think that it would be a good idea to check your bp's 
periodically over ther course of a day to see where you generally run.It is 
very important for you to take your meds daily. Alcohol will also increase your 
bp.  Try to cutback.M17.12 Unilateral primary osteoarthritis, left kneeComments:
LEFT KNEE PAIN/ osteoarthritis:For TKRZ23 Encounter for immunization



Functional Status







 Description

 

 No Information Available







Mental Status







 Description

 

 No Information Available







Referrals







 Description

 

 No Information Available

## 2020-03-03 NOTE — XMS REPORT
Continuity of Care Document (CCD)

 Created on:2020



Patient:Terra Shepherd

Sex:Female

:1955

External Reference #:[id number]





Demographics







 Address  [address]



   [place] [address]

 

 Home Phone  [phone]

 

 Mobile Phone  [phone]

 

 Work Phone  [phone]

 

 Email Address  [email or web address]

 

 Preferred Language  en

 

 Marital Status  Not  or 

 

 Orthodox Affiliation  Unknown

 

 Race  White

 

 Ethnic Group  Not  or 









Author







 Name  Ava Brody M.D. (transmitted by agent of provider Alexus May)

 

 Address  [address] [facility]



   [place] [address]









Care Team Providers







 Name  Role  Phone

 

 Wendy Parekh MD - Internal  Care Team Information   [phone]
[phone]



 Medicine    









Problems







 Active Problems  Provider  Date

 

 Mixed hyperlipidemia  Lorrie Rodríguez M.D., FACP  Onset: 2011

 

 Benign essential hypertension  Laith Hart M.D.  Onset: 2011

 

 Localized, primary osteoarthritis of the  Mo Fountain MD  Onset: 2017



 hand    

 

 Localized, primary osteoarthritis  Ava Brody M.D.  Onset: 10/26/2018







Social History







 Type  Date  Description  Comments

 

 Birth Sex    Unknown  

 

 Tobacco Use  Start: Unknown End:  Former Cigarette Smoker  Quit ~ 



   Unknown  1 Pack Daily  

 

 Smoking Status  Reviewed: 20  Former Cigarette Smoker  Quit ~ 



     1 Pack Daily  

 

 ETOH Use    Currently consumes  2 glasses of wine



     alcohol  daily

 

 ETOH Use    Drinks 2 Alcoholic  



     Beverages Per Day  

 

 Tobacco Use  Start: Unknown End:  Patient is a former  quit at age 29



   Unknown  smoker  

 

 Recreational Drug Use    Denies Drug Use  

 

 Exercise Type/Frequency    Exercises regularly  







Allergies, Adverse Reactions, Alerts







 Description

 

 No Known Drug Allergies







Medications







 Active Medications  SIG  Qnty  Indications  Ordering  Date



         Provider  

 

 Amoxicillin  4 tabs 1 hour prior  4tabs    Fran Caldera,  2019



          500mg Tablets  to dental      MD  



   procedures        

 

 Cyclobenzaprine HCL  take 1 tab by mouth  90tabs  Z47.1  Ava Brody,  2019



                  10mg  2-3 times a day as      M.D.  



 Tablets  needed        



           

 

 BD  use as directed for  6units    Archana Mendez,  2018



 3ml  vitamin b 12      N.P.  



   injection once a        



   month        

 

 Fenofibrate  take 1 tablet by  90tabs    Archana Velazquezmaikol,  2011



          54mg Tablets  mouth daily      N.P.  



           

 

 Syringes  use as directed for  30unbi Mendez,  2010



       25Gauge 1 Inch  vitamin b12      N.P.  



   injections once        



   monthly        

 

 Simvastatin  take 1 tablet at  90tabs    Archana Mendez,  



          10mg Tablets  bedtime      N.P.  



           

 

 Atenolol  Take 1 Tablet By  90tabs    Archana Velazquezmaikol,  



       50mg Tablets  Mouth Twice A Day      N.P.  



           

 

 Cyanocobalamin  inject 1  1units    Archana Velazquezmaikol,  



             1000mcg/ML  milliliters      N.P.  



 Solution  intramuscular once        



   a month        

 

 Tylenol        Unknown  

 

 Escitalopram Oxalate  Take 1 Tablet By  30tabs    Archana Varn,  



                   10mg  Mouth Every Day      N.P.  



 Tablets          



           







Medications Administered in Office







 Medication  SIG  Qnty  Indications  Ordering Provider  Date

 

 Synvisc Or Synvisc-One Injection 1        Ava Brody M.D.  2019



 MG                      Injection          



           

 

 Synvisc Or Synvisc-One Injection 1        Ava Brody M.D.  2018



 MG                      Injection          



           

 

 Synvisc Or Synvisc-One Injection 1        Ava Brody M.D.  2018



 MG                      Injection          



           

 

 Synvisc Or Synvisc-One Injection Ale Brody M.D.  12/10/2018



 MG                      Injection          



           

 

 Synvisc Or Synvisc-One Injection 1        Ava Brody M.D.  12/10/2018



 MG                      Injection          



           

 

 Synvisc Or Synvisc-One Injection Ale Brody M.D.  2018



 MG                      Injection          



           

 

 Depomedrol 40MG        Ava Brody M.D.  2018



   Injection          

 

 Depomedrol 40MG        Ava Brody M.D.  10/26/2018



   Injection          

 

 B-12 Injection        Nurse Visit A  2014



  Injection          







Immunizations







 CPT Code  Status  Date  Vaccine  Reaction  Lot #

 

 [id number]  Given  2020  Influenza Virus Vaccine,    



       Quadrivalent, Split,    



       Preservative Free    

 

 80510  Given  2020  Influenza Virus Vaccine,    [id number]



       Quadrivalent, Split,    



       Preservative Free    

 

 29447  Given  2019  Tetanus And Diptheria (Td)  No immediate  [id number]



       For Adult Use Preservative  reaction..jh  



       Free    

 

 62200  Given  2019  Pneumococcal Conjugate  No immediate  [id number]



       Vaccine 13 Valent For  reaction...  



       Intramuscular Use    

 

 06601  Given  2018  Influenza Virus Vaccine,    



       Quadrivalent, Split,    



       Preservative Free    

 

   Given  2016  Flu Vaccine NOS    

 

 29644  Given  10/10/2015  Influenza Virus Vaccine,    



       Quadrivalent, Split,    



       Preservative Free    

 

   Given  2014  Fluvirin Im 3Yrs And Older    

 

   Given  2013  Fluvirin Im 3Yrs And Older    

 

   Given  2012  Afluria Vaccine    

 

 90377  Given  2011  Influenza Virus 3Yrs &    



       Over    

 

 09629  Given  2011  Influenza Virus 3Yrs &    



       Over    

 

 28440  Given  2011  Influenza Virus 3Yrs &    [id number]



       Over    

 

 80956  Given  10/21/2010  Influenza Virus 3Yrs &    



       Over    

 

 66584  Given  10/28/2009  Influenza Virus 3Yrs &    



       Over    

 

 54102  Given  2008  Tdap -    



       Tetanus/Diptheria/Acellula    



       r Pertussis    







Vital Signs







 Date  Vital  Result  Comment

 

 2020  2:04pm  Weight  150.00 lb  









 Heart Rate  86 /min  

 

 BP Systolic  136 mmHg  

 

 BP Diastolic  80 mmHg  

 

 Body Temperature  98.2 F  









 2020  2:28pm  Height  62.5 inches  5'2.50"









 Weight  156.00 lb  

 

 Heart Rate  65 /min  

 

 BP Systolic Sitting  179 mmHg  Rue reg cuff

 

 BP Diastolic Sitting  99 mmHg  Rue reg cuff

 

 Body Temperature  99.3 F  

 

 O2 % BldC Oximetry  99 %  

 

 BMI (Body Mass Index)  28.1 kg/m2  







Results







 Test  Acquired Date  Facility  Test  Result  H/L  Range  Note

 

 Lipid Profile  2019  [facility]  Triglycerides  261 mg/dL    
  1



 (Trig/Chol/HDL)    [address] [place] [address] [phone]          









 Cholesterol  228 mg/dL      2

 

 HDL Cholesterol  82.8 mg/dL      3

 

 LDL Cholesterol  93 mg/dL      4









 Comp Metabolic  2019  [facility]  Sodium  140 mmol/L  Normal  
135-145  



 Panel    [address] [facility] [place] [address] [phone]          









 Potassium  4.1 mmol/L  Normal  3.5-5.0  

 

 Chloride  105 mmol/L  Normal  101-111  

 

 Co2 Carbon Dioxide  30 mmol/L  Normal  22-32  

 

 Anion Gap  5 mmol/L  Normal  2-11  

 

 Glucose  123 mg/dL  High    

 

 Blood Urea Nitrogen  14 mg/dL  Normal  6-24  

 

 Creatinine  0.77 mg/dL  Normal  0.51-0.95  

 

 BUN/Creatinine Ratio  18.2  Normal  8-20  

 

 Calcium  9.6 mg/dL  Normal  8.6-10.3  

 

 Total Protein  6.6 g/dL  Normal  6.4-8.9  

 

 Albumin  4.4 g/dL  Normal  3.2-5.2  

 

 Globulin  2.2 g/dL  Normal  2-4  

 

 Albumin/Globulin Ratio  2.0  Normal  1-3  

 

 Total Bilirubin  0.90 mg/dL  Normal  0.2-1.0  

 

 Alkaline Phosphatase  64 U/L  Normal    

 

 Alt  17 U/L  Normal  7-52  

 

 Ast  17 U/L  Normal  13-39  

 

 Egfr Non-  75.2    >60  

 

 Egfr   91.0    >60  5









 Laboratory test  2019  [facility]  Vitamin B12  356 pg/mL  
Normal  180-914  6



 finding    [address] [place] [address] [phone]          









 1  Desirable: <150



   Borderline High: 150-199



   High: 200-499



   Very High: >500

 

 2  Desirable: <200



   Borderline High: 200-239



   High: >239

 

 3  Low: <40



   Desirable: 40-60



   High: >60

 

 4  Desirable: <100



   Near Optimal: 100-129



   Borderline High: 130-159



   High: 160-189



   Very High: >189

 

 5  *******Because ethnic data is not always readily available,



   this report includes an eGFR for both -Americans and



   non- Americans.****



   The National Kidney Disease Education Program (NKDEP) does



   not endorse the use of the MDRD equation for patients that



   are not between the ages of 18 and 70, are pregnant, have



   extremes of body size, muscle mass, or nutritional status,



   or are non- or non-.



   According to the National Kidney Foundation, irrespective of



   diagnosis, the stage of the disease is based on the level of



   kidney function:



   Stage Description                      GFR(mL/min/1.73 m(2))



   1     Kidney damage with normal or decreased GFR       90



   2     Kidney damage with mild decrease in GFR          60-89



   3     Moderate decrease in GFR                         30-59



   4     Severe decrease in GFR                           15-29



   5     Kidney failure                       <15 (or dialysis)

 

 6  Normal Range 180 to 914



   Indeterminate Range 145 to 180



   Deficient Range  <145







Procedures







 Date  Code  Description  Status

 

 2020  55057  EKG Tracing & Interpretation  Completed

 

 10/07/2019  26286155  Mammogram  Completed

 

 10/04/2018  [id number]  Mammogram  Completed

 

 2018  59095469  Colonoscopy  Completed

 

 2017  [id number]  Mammogram  Completed

 

 2016  26064660  Mammogram  Completed

 

 01/15/2015  [id number]  Mammogram  Completed

 

 2014  33135085  Mammogram  Completed

 

 2013  34789987  Colonoscopy  Completed

 

 2012  [id number]  Mammogram  Completed

 

 10/11/2011  008412923  Bone Mineral Density Test  Completed

 

 10/11/2011  [id number]  Mammogram  Completed

 

 2010  62304284  Mammogram  Completed

 

 09/10/2007  45541127  Colonoscopy  Completed

 

 2007  658359328  Bone Mineral Density Test  Completed







Medical Devices







 Description

 

 No Information Available







Encounters







 Type  Date  Location  Provider  Dx  Diagnosis

 

 Office Visit  2019  [facility]  Ava Brody,  M25.562  Pain in 
left knee



   1:00p  at [facility]    









 M25.462  Effusion, left knee

 

 M17.12  Unilateral primary osteoarthritis, left knee









 Office Visit  2019  9:20a  [facility]  Archana Mendez,  Z00.00  Encntr 
for



     Medicine - [facility]  N.P.    general adult



           medical exam



           w/o abnormal



           findings









 Z12.31  Encntr screen mammogram for malignant neoplasm of breast

 

 I10  Essential (primary) hypertension

 

 D51.9  Vitamin B12 deficiency anemia, unspecified

 

 E78.00  Pure hypercholesterolemia, unspecified

 

 N95.8  Other specified menopausal and perimenopausal disorders

 

 Z23  Encounter for immunization







Assessments







 Date  Code  Description  Provider

 

 2020  M25.562  Pain in left knee  Ava Brody M.D.

 

 2020  M25.462  Effusion, left knee  Ava Brody M.D.

 

 2020  M17.12  Unilateral primary osteoarthritis, left  Ava Brody M.D.



     knee  

 

 2020  Z01.818  Encounter for other preprocedural  Lorrie Rodríguez M.D., FACP



     examination  

 

 2020  I10  Essential (primary) hypertension  Lorrie Rodríguez M.D., FACP

 

 2020  M17.12  Unilateral primary osteoarthritis, left  Lorrie Rodríguez M.D.
, FACP



     knee  

 

 2020  Z23  Encounter for immunization  Lorrie Rodríguez M.D., FACP

 

 2019  M25.562  Pain in left knee  Ava Brody M.D.

 

 2019  M25.462  Effusion, left knee  Ava Brody M.D.

 

 2019  M17.12  Unilateral primary osteoarthritis, left  Ava Brody M.D.



     knee  

 

 2019  Z00.00  Encounter for general adult medical  Archana Mendez, N.P.



     examination without abnormal findings  

 

 2019  Z12.31  Encounter for screening mammogram for  Archana Mendez, N.P.



     malignant neoplasm of breast  

 

 2019  I10  Essential (primary) hypertension  Archana Varn, N.P.

 

 2019  D51.9  Vitamin B12 deficiency anemia, unspecified  Archana Varn, 
N.P.

 

 2019  E78.00  Pure hypercholesterolemia, unspecified  Archana Varn, N.P.

 

 2019  N95.8  Other specified menopausal and  Archana Varn, N.P.



     perimenopausal disorders  

 

 2019  Z23  Encounter for immunization  Archana Varn, N.P.







Plan of Treatment

Future Appointment(s):2020 11:00 am - Ava Brody M.D. at [facility] at [id number] 11:45 am - Qutaybeh S. Maghaydah, M.D. at 
[facility]2020  4:00 pm - Lorrie Rodríguez M.D., FACP at [facility] 
Internal Medicine - [facility]2020  4:30 pm - Ava Brody M.D. at [facility] at [id number] - Ava Brody M.D.M25.562 Pain in left 
kneeFollow up:Follow up:   2 weeks after jomhqbeG83.462 Effusion, left 
kneeM17.12 Unilateral primary osteoarthritis, left knee



Functional Status







 Description

 

 No Information Available







Mental Status







 Description

 

 No Information Available







Referrals







 Description

 

 No Information Available

## 2020-03-03 NOTE — CONSULT
Consult


Consult: 





HOSPITALIST CONSULTATION





Date of Consultation: 3/3/20


Requesting Provider: Dr. Brody


Reason for Consultation: medical co management





HPI: Ms Shepherd is a 63 yo F who has a h/o HTN and OA is admitted post L TKA. The 

patient has been deemed to have end stage OA involving the L knee that failed 

conservative management. She states currently she is feeling well. She denies 

any nausea. She has gotten up to the chair. Her pain is well controlled 

currently. She is trying to decide if she wants the ruiz out now or wait until 

tomorrow AM. Prior to presenting for her operation she stated she had been 

having some SOB. Because of this she underwent stress testing. No SOB at this 

time. No chest pain. 





PMHx: HTN, HLD, OA


PSHx: R TKA, L foot bunionectomy, trigger thumb release, 








All: oxycodone, adhesive tape, latex


Meds: Home and current medications reviewed





FamHx: +for CAD in mom, dad had colon cancer


SocHx: pt is a former smoker, quit at age 29; drinks EtOH nightly; .





ROS: complete 11 system ROS obtained, pertinent positives and negatives as per 

HPI and otherwise negative. 





PE: 


/87 HR 71 RR 16 T 97.6 O2 sat 100% on RA


gen: awake, sitting up in chair, NAD


HEENT: EOMI, oropharynx clear and moist


Card: nl S1S2 RRR, no LE edema


Lungs: few bibasilar crackles


Abd: BS+ soft, NT, ND


Musculo: L knee in post op dressing with cryounit in place


Neuro: nl UE strength bilaterally, LE strength not tested at this time


Skin: no rash


Psych: A&Ox3





A/P:


Ms Shepherd is a 63 yo F who has a h/o HTN and HLD who underwent elective L TKA. 


1. L TKA: management per orthopedics including DVT prophylaxis. 


2. HTN: pt's BP is moderately elevated despite pain being relatively 

controlled. She tells me that preoperatively when she would check her BP at 

home her SBP was typically in the 150-160's. Will continue atenolol 50mg daily. 

Monitor BP and if BP is still elevated will consider adding amlodipine 5mg 

daily to achieve better control. 


3. HLD: continue statin.


4. Depression: continue lexapro. 


5. DVT-P: eliquis to start tomorrow AM


6. Full code

## 2020-03-03 NOTE — PN
Progress Note





- Progress Note


Date of Service: 03/03/20


Note: 





Pt seen at bedside. SHe feels well without complaint. Denies CP, SOB, dizziness

, nausea. Dressing CDI, df/pf intact, dp2+, sensation intact to light touch 

distally.  Desires tramadol for pain control. Struggled with nausea post op in 

the past, zofran, compazine and scop patch available

## 2020-03-03 NOTE — OP
Operative Report - Blank





- Operative Report


Date of Operation: 03/03/20


Note: 





PRESLEY CASTANON


1955





Date of Surgery: 3/3/20





Ava Brody MD





Assistant: BERNADETTE REBOLLEDO did help throughout the procedure with preparation of 

the knee, wound retraction, manipulation of the knee, and wound closure.  





Anesthesiologist: Dr. Valdes





Anesthesia Type: Spinal





Preoperative Diagnosis: Left severe degenerative osteoarthritis of the knee





Postoperative Diagnosis: As above





Procedure Performed: Left Total Knee Arthroplasty





Tourniquet time: 37 minutes





Complications: None





Specimen: Bone and cartilage from the left knee joint sent to pathology.  





Hardware Used: Cemented [employer] total knee hardware was used - For the 

femur a size 5 narrow left oxinium legion posterior stabilized femoral component

, for the tibia a size 3 left nery II tibial baseplate, for the insert a 

size 9mm 3-4 posterior stabilized articular polyethylene insert, and for the 

patella a size 32 3-peg all poly patella.  





Brief History/Indication: PRESLEY CASTANON was known in clinic and had a 

history of severe left knee pain and swelling. She failed conservative 

treatment with anti-inflammatories, pain pills, intra-articular injections and 

physical therapy.  She elected to undergo left total knee arthroplasty due to 

continued pain and decreased quality of life.  Radiographs showed severe end 

stage osteoarthritis of the knee with bone on bone contact.  Informed consent 

was obtained from the patient.  She understood the risks of surgery included 

but were not limited to: bleeding, infection, damage to nearby structures, 

intraoperative fracture, nerve palsy, failure of the hardware, early loosening, 

knee stiffness or loss of motion, anesthesia complications, stroke, heart attack

, blood clot and death.  She wished to proceed.





Intra-Operative Findings: Intraoperatively the patient was noted to have severe 

loss of cartilage in all 3 compartments of the knee.  





Description of the Procedure: 





PRESLEY CASTANON was identified in the preanesthesia unit. Her left knee was 

marked as the correct operative side.  Informed consent was signed and placed 

in the chart. The patient was taken to the operating room and placed under 

anesthesia without complication. A ruiz catheter was placed. A tourniquet was 

placed on the left thigh. The left lower extremity was prepped and draped in 

the usual sterile fashion. Preoperative time-out was made to correctly identify 

the patient, side and site. Appropriate intraoperative antibiotics were given 

within one hour of incision.





Tourniquet was inflated. A midline incision was made and carried sharply down 

to the extensor mechanism. A new 10 blade was used to make a standard medial 

parapatellar arthrotomy. The patella was subluxed laterally. Electrocautery was 

used to dissect soft tissue off the superomedial tibia to the midsagittal 

plane.  The knee was flexed up. The anterior horn of the lateral meniscus and 

the ACL were sharply incised. A drill was used to enter the distal femur. The 

intramedullary distal femoral cutting guide was pinned on the distal femur. The 

oscillating saw was used to make the distal femoral cut.





The external rotation guide was pinned on the distal femur and the distal femur 

was sized to a size 5. The size 5 multi-cutting jig was pinned on the distal 

femur. The oscillating saw was used to make the appropriate 4 chamfer cuts.  

Next the PCL was completely released.  The extramedullary tibial cutting guide 

was pinned on the proximal tibia and the oscillating saw was used to make the 

proximal tibial cut perpendicular to the mechanical axis of the tibia. The bone 

was carefully removed.





The knee was brought out into full extension. The spacer block was placed and 

had excellent fit with the knee in full extension. The medial and lateral 

ligaments were well balanced. The flexion and extension gaps were well 

balanced. The knee was flexed up. Lamina  was placed both medially and 

laterally. Any remaining meniscus was removed with electrocautery.  Curved 

osteotome was used to remove any posterior osteophytes. The tibial tray and 

drop thor were placed and confirmed a satisfactory tibial cut.





The size 5 left narrow femoral trial was impacted onto the distal femur. This 

trial had excellent fit and stability. The box for the posterior stabilized 

implant was prepared using a box cut osteotome and a reamer. Next a tibial tray 

trial and 9 mm insert trial was placed. The knee was taken through a range of 

motion and had full extension to 130 degrees of flexion. Patellofemoral 

tracking was satisfactory.





The patella was inverted and sized to a size 32. Three peg holes were drilled 

through the size 32 drill guide. The trial patella was placed and the knee was 

taken through a range of motion. There was satisfactory patellofemoral 

tracking. All trials were removed. The tibia was subluxed anteriorly and sized 

to a size 3. The proximal tibial was prepared with a size 3 keel punch.  All 

bony cut surfaces were irrigated with sterile saline and dried. Final implants 

were cemented into place starting with the tibia, followed by the femur, and 

last the patella. A 9 mm insert trial was placed and the knee was brought into 

full extension.





Tourniquet was turned down and the knee was copiously irrigated with sterile 

saline. Electrocautery was used to obtain meticulous hemostasis. Once the 

cement had fully cured, the insert trial was removed. Any excess cement was 

removed from around the hardware and capsule.  Final insert chosen was a 9 mm 

posterior stabilized Nery II articular insert size 3-4. Stability of the 

insert was checked and noted to be stable.





The extensor mechanism was closed using number 1 vicryls. The rest of the 

incision was closed in a layered fashion using 0 and 2-0 vicryls. The skin was 

closed using 3-0 nylon suture. Sterile xeroform, 4x4s and webril were used to 

cover the incision.  Ace wrap and cold pack were used to cover the dressings. 

The patients anesthesia was reversed without difficulty. She was taken to the 

PACU in stable condition.  Intended weight-bearing will be as tolerated.

## 2020-03-04 VITALS — DIASTOLIC BLOOD PRESSURE: 77 MMHG | SYSTOLIC BLOOD PRESSURE: 116 MMHG

## 2020-03-04 LAB
ANION GAP SERPL CALC-SCNC: 4 MMOL/L (ref 2–11)
BUN SERPL-MCNC: 9 MG/DL (ref 6–24)
BUN/CREAT SERPL: 13.4 (ref 8–20)
CALCIUM SERPL-MCNC: 8.8 MG/DL (ref 8.6–10.3)
CHLORIDE SERPL-SCNC: 107 MMOL/L (ref 101–111)
GLUCOSE SERPL-MCNC: 114 MG/DL (ref 70–100)
HCO3 SERPL-SCNC: 28 MMOL/L (ref 22–32)
HCT VFR BLD AUTO: 30 % (ref 35–47)
HGB BLD-MCNC: 10.2 G/DL (ref 12–16)
PLATELET # BLD AUTO: 179 10^3/UL (ref 150–450)
POTASSIUM SERPL-SCNC: 4.2 MMOL/L (ref 3.5–5)
SODIUM SERPL-SCNC: 139 MMOL/L (ref 135–145)

## 2020-03-04 RX ADMIN — DOCUSATE SODIUM SCH MG: 100 CAPSULE, LIQUID FILLED ORAL at 08:39

## 2020-03-04 RX ADMIN — TRAMADOL HYDROCHLORIDE PRN MG: 50 TABLET, FILM COATED ORAL at 12:19

## 2020-03-04 RX ADMIN — ACETAMINOPHEN PRN MG: 325 TABLET ORAL at 09:31

## 2020-03-04 RX ADMIN — ACETAMINOPHEN PRN MG: 325 TABLET ORAL at 01:31

## 2020-03-04 RX ADMIN — CEFAZOLIN SCH MLS/HR: 1 INJECTION, POWDER, FOR SOLUTION INTRAVENOUS at 11:22

## 2020-03-04 RX ADMIN — SODIUM CHLORIDE, SODIUM LACTATE, POTASSIUM CHLORIDE, AND CALCIUM CHLORIDE SCH MLS/HR: 600; 310; 30; 20 INJECTION, SOLUTION INTRAVENOUS at 01:27

## 2020-03-04 RX ADMIN — TRAMADOL HYDROCHLORIDE PRN MG: 50 TABLET, FILM COATED ORAL at 03:53

## 2020-03-04 RX ADMIN — CEFAZOLIN SCH MLS/HR: 1 INJECTION, POWDER, FOR SOLUTION INTRAVENOUS at 03:54

## 2020-03-04 RX ADMIN — MAGNESIUM HYDROXIDE SCH: 400 SUSPENSION ORAL at 08:37

## 2020-03-04 NOTE — DS
Orthopedic Discharge Summary





- Discharge Summary





Date of Admission:20


Date of Discharge: 3/4/20


Date of Surgery: 3/3/20


Attending Orthopedic Provider: Dr Brody


Pre-operative Diagnosis: Left knee osteoarthritis


Operative Procedure: left total knee replacement


Disposition of Patient: home


Home care vs Outpatient services: outpatient 


Condition of Patient: stable


Pain medication RX at discharge: tramasol 50 mg 1-2 tabs q 6 hr mdd 8 


DVT prophylaxis RX at discharge: eliquis 2.5 mg po bid x 30 days 


History: PRESLEY CASTANON is a 64 year old F with years of increasingly severe 

left knee pain. Patient has failed conservative management and has elected to 

undergo a left total knee replacement


Hospital Course: PRESLEY was admitted to [facility] on 20. 

Patient underwent a left total knee replacement without complication followed 

by a brief recovery in PACU and transfer to the Short Stay Surgical Unit in 

stable condition. Our hospitalist service, physical therapy and occupational 

therapy also participated in this patients care. Post-op day 1: Pt seen at 

bedside. She feels well without CP, SOB, dizziness, nausea. Knee pain is well 

controlled and she desires DC home. patient was alert and in no acute distress. 

Dressing was clean, dry and intact. Operative extremity dorsiflexion and 

plantarflexion intact, sensation intact to light touch distally, DP2+. Prior to 

discharge: dressing was changed, incision was clean, dry and intact. Patient 

was deemed to be medically and orthopedically stable for discharge. Physical 

therapy goals were met.





 Home Medications











 Medication  Instructions  Recorded  Confirmed  Type


 


Atenolol TAB* Tenormin TAB* 50 MG 50 mg PO QAM 10/12/17 03/03/20 History


 


Cyanocobalamin INJ * [Vitamin B12 1,000 mcg IM MONTHLY 10/12/17 02/21/20 History





INJ *]    


 


Fenofibrate [Tricor] 54 mg PO QAM 10/12/17 03/03/20 History


 


Simvastatin [Zocor 5 MG-] 10 mg PO QAM 10/12/17 03/03/20 History


 


Escitalopram Oxalate [Lexapro 10 10 mg PO QAM 04/15/19 03/03/20 History





mg]    


 


Amoxicillin 2,000 mg PO SEE INSTRUCTIONS 20 History


 


Acetaminophen TAB* [Tylenol TAB*] 650 mg PO Q8HR PRN  tab 20  Rx


 


Apixaban* [Eliquis*] 2.5 mg PO BID #60 tab 20  Rx


 


Docusate CAP* [Colace Cap*] 100 mg PO BID PRN #50 cap 20  Rx


 


traMADol TAB* [Ultram*] 50 mg PO Q6H PRN #50 tab MDD 8 20  Rx











Discharge Instructions following Orthopedic Surgery:





Activity:  


* Weight Bearing as tolerated


* Continue physical therapy and occupational therapy exercises as shown


* you have elected outpatient physical therapy, please start therapy as an 

outpatient right away.





Wound care: 


* OK to shower on post-op day 3, no bathing, swimming, or submerging wound. 


* Use gentle soap, pat dry. Cover with gauze, ACE wrap or tape.





Call Orthopedic office for: 


* Increased drainage


* Redness


* Increased pain


* Fever 





***Go to ER with shortness of breath or chest pain.***





Diet: 


* Regular diet


* Increase fluids and fiber to prevent constipation. 


* Continue to use stool softeners, call office if no bowel motion within 48 

hours.








Medications


See Home Medication List in your packet for medications that you should take 

after discharge.





DVT Prophylaxis:





   Eliquis Dosin.5 mg, 1 tab every 12 hours x 30 days.  Increases bleeding 

tendency





Pain Control:





         Tramadol 50 mg tabs: take 1 tab for moderate pain and 2 tabs for every 

pain every 6 hours as needed. Max 8 per day. Hold for sedation, wean off as 

soon as pain allows.





   





Antibiotics are required prior to any dental work.








FOLLOW UP: Follow up with Dr. Perez] Within [10-14] days, call for appointment





Please call our office with any questions or concerns ([phone])





[facility] [place]